# Patient Record
Sex: FEMALE | URBAN - METROPOLITAN AREA
[De-identification: names, ages, dates, MRNs, and addresses within clinical notes are randomized per-mention and may not be internally consistent; named-entity substitution may affect disease eponyms.]

---

## 2024-09-23 ENCOUNTER — APPOINTMENT (OUTPATIENT)
Dept: MEDICAL GROUP | Facility: MEDICAL CENTER | Age: 34
End: 2024-09-23

## 2024-10-29 ENCOUNTER — RESEARCH ENCOUNTER (OUTPATIENT)
Dept: RESEARCH | Facility: MEDICAL CENTER | Age: 34
End: 2024-10-29

## 2024-10-29 ENCOUNTER — HOSPITAL ENCOUNTER (OUTPATIENT)
Dept: LAB | Facility: MEDICAL CENTER | Age: 34
End: 2024-10-29
Attending: STUDENT IN AN ORGANIZED HEALTH CARE EDUCATION/TRAINING PROGRAM
Payer: COMMERCIAL

## 2024-10-29 ENCOUNTER — OFFICE VISIT (OUTPATIENT)
Dept: MEDICAL GROUP | Facility: MEDICAL CENTER | Age: 34
End: 2024-10-29
Payer: COMMERCIAL

## 2024-10-29 VITALS
HEART RATE: 92 BPM | HEIGHT: 62 IN | BODY MASS INDEX: 24.86 KG/M2 | OXYGEN SATURATION: 94 % | WEIGHT: 135.1 LBS | DIASTOLIC BLOOD PRESSURE: 70 MMHG | TEMPERATURE: 97.9 F | SYSTOLIC BLOOD PRESSURE: 100 MMHG

## 2024-10-29 DIAGNOSIS — E10.9 TYPE 1 DIABETES MELLITUS WITHOUT COMPLICATION (HCC): Primary | Chronic | ICD-10-CM

## 2024-10-29 DIAGNOSIS — Z80.3 FAMILY HISTORY OF BREAST CANCER: ICD-10-CM

## 2024-10-29 DIAGNOSIS — E78.5 HYPERLIPIDEMIA, UNSPECIFIED HYPERLIPIDEMIA TYPE: ICD-10-CM

## 2024-10-29 DIAGNOSIS — Z00.00 HEALTHCARE MAINTENANCE: ICD-10-CM

## 2024-10-29 DIAGNOSIS — F43.21 GRIEF REACTION: ICD-10-CM

## 2024-10-29 DIAGNOSIS — Z53.1 TRANSFUSION OF BLOOD PRODUCT REFUSED FOR RELIGIOUS REASON: ICD-10-CM

## 2024-10-29 DIAGNOSIS — Z12.4 SCREENING FOR CERVICAL CANCER: ICD-10-CM

## 2024-10-29 PROBLEM — F41.1 GAD (GENERALIZED ANXIETY DISORDER): Status: ACTIVE | Noted: 2023-07-17

## 2024-10-29 PROBLEM — F43.20 GRIEF REACTION: Status: ACTIVE | Noted: 2023-07-17

## 2024-10-29 PROBLEM — N91.5 OLIGOMENORRHEA: Status: ACTIVE | Noted: 2017-05-23

## 2024-10-29 PROBLEM — G56.22 ULNAR NEUROPATHY AT ELBOW OF LEFT UPPER EXTREMITY: Status: ACTIVE | Noted: 2020-05-06

## 2024-10-29 PROBLEM — N91.5 OLIGOMENORRHEA: Status: RESOLVED | Noted: 2017-05-23 | Resolved: 2024-10-29

## 2024-10-29 PROBLEM — G56.22 ULNAR NEUROPATHY AT ELBOW OF LEFT UPPER EXTREMITY: Status: RESOLVED | Noted: 2020-05-06 | Resolved: 2024-10-29

## 2024-10-29 LAB
ALBUMIN SERPL BCP-MCNC: 4.2 G/DL (ref 3.2–4.9)
ALBUMIN/GLOB SERPL: 1.3 G/DL
ALP SERPL-CCNC: 49 U/L (ref 30–99)
ALT SERPL-CCNC: 15 U/L (ref 2–50)
ANION GAP SERPL CALC-SCNC: 12 MMOL/L (ref 7–16)
AST SERPL-CCNC: 15 U/L (ref 12–45)
BILIRUB SERPL-MCNC: 0.3 MG/DL (ref 0.1–1.5)
BUN SERPL-MCNC: 13 MG/DL (ref 8–22)
CALCIUM ALBUM COR SERPL-MCNC: 9.4 MG/DL (ref 8.5–10.5)
CALCIUM SERPL-MCNC: 9.6 MG/DL (ref 8.5–10.5)
CHLORIDE SERPL-SCNC: 98 MMOL/L (ref 96–112)
CHOLEST SERPL-MCNC: 179 MG/DL (ref 100–199)
CO2 SERPL-SCNC: 24 MMOL/L (ref 20–33)
CREAT SERPL-MCNC: 0.67 MG/DL (ref 0.5–1.4)
ERYTHROCYTE [DISTWIDTH] IN BLOOD BY AUTOMATED COUNT: 40 FL (ref 35.9–50)
FASTING STATUS PATIENT QL REPORTED: NORMAL
GFR SERPLBLD CREATININE-BSD FMLA CKD-EPI: 117 ML/MIN/1.73 M 2
GLOBULIN SER CALC-MCNC: 3.3 G/DL (ref 1.9–3.5)
GLUCOSE SERPL-MCNC: 144 MG/DL (ref 65–99)
HBA1C MFR BLD: 7.3 % (ref ?–5.8)
HCT VFR BLD AUTO: 39 % (ref 37–47)
HDLC SERPL-MCNC: 79 MG/DL
HGB BLD-MCNC: 12.6 G/DL (ref 12–16)
LDLC SERPL CALC-MCNC: 88 MG/DL
MCH RBC QN AUTO: 24.6 PG (ref 27–33)
MCHC RBC AUTO-ENTMCNC: 32.3 G/DL (ref 32.2–35.5)
MCV RBC AUTO: 76 FL (ref 81.4–97.8)
PLATELET # BLD AUTO: 288 K/UL (ref 164–446)
PMV BLD AUTO: 11.3 FL (ref 9–12.9)
POCT INT CON NEG: NEGATIVE
POCT INT CON POS: POSITIVE
POTASSIUM SERPL-SCNC: 3.9 MMOL/L (ref 3.6–5.5)
PROT SERPL-MCNC: 7.5 G/DL (ref 6–8.2)
RBC # BLD AUTO: 5.13 M/UL (ref 4.2–5.4)
SODIUM SERPL-SCNC: 134 MMOL/L (ref 135–145)
TRIGL SERPL-MCNC: 61 MG/DL (ref 0–149)
TSH SERPL DL<=0.005 MIU/L-ACNC: 2.03 UIU/ML (ref 0.38–5.33)
WBC # BLD AUTO: 5.1 K/UL (ref 4.8–10.8)

## 2024-10-29 PROCEDURE — 80061 LIPID PANEL: CPT

## 2024-10-29 PROCEDURE — 80053 COMPREHEN METABOLIC PANEL: CPT

## 2024-10-29 PROCEDURE — 85027 COMPLETE CBC AUTOMATED: CPT

## 2024-10-29 PROCEDURE — 84443 ASSAY THYROID STIM HORMONE: CPT

## 2024-10-29 PROCEDURE — 36415 COLL VENOUS BLD VENIPUNCTURE: CPT

## 2024-10-29 RX ORDER — INSULIN LISPRO 100 [IU]/ML
INJECTION, SOLUTION INTRAVENOUS; SUBCUTANEOUS
COMMUNITY
Start: 2024-10-21

## 2024-10-29 RX ORDER — ACYCLOVIR 400 MG/1
TABLET ORAL
COMMUNITY
Start: 2024-09-12

## 2024-10-29 ASSESSMENT — FIBROSIS 4 INDEX: FIB4 SCORE: 0.51

## 2024-10-29 ASSESSMENT — PATIENT HEALTH QUESTIONNAIRE - PHQ9: CLINICAL INTERPRETATION OF PHQ2 SCORE: 0

## 2024-10-29 NOTE — RESEARCH NOTE
Patient has been referred by Rhonda Garcia M.D.. The initial referral follow-up message, which includes the consent form link, has been sent to the patient.    Eligible Studies: HNP

## 2024-11-04 ENCOUNTER — OFFICE VISIT (OUTPATIENT)
Dept: MEDICAL GROUP | Facility: MEDICAL CENTER | Age: 34
End: 2024-11-04
Payer: COMMERCIAL

## 2024-11-04 ENCOUNTER — HOSPITAL ENCOUNTER (OUTPATIENT)
Facility: MEDICAL CENTER | Age: 34
End: 2024-11-04
Attending: STUDENT IN AN ORGANIZED HEALTH CARE EDUCATION/TRAINING PROGRAM
Payer: COMMERCIAL

## 2024-11-04 VITALS
RESPIRATION RATE: 100 BRPM | SYSTOLIC BLOOD PRESSURE: 110 MMHG | DIASTOLIC BLOOD PRESSURE: 66 MMHG | TEMPERATURE: 97 F | BODY MASS INDEX: 24.29 KG/M2 | HEIGHT: 62 IN | WEIGHT: 132 LBS | HEART RATE: 95 BPM

## 2024-11-04 DIAGNOSIS — E61.1 IRON DEFICIENCY: ICD-10-CM

## 2024-11-04 DIAGNOSIS — Z01.84 IMMUNITY STATUS TESTING: ICD-10-CM

## 2024-11-04 DIAGNOSIS — R71.8 LOW MEAN CORPUSCULAR VOLUME (MCV): ICD-10-CM

## 2024-11-04 DIAGNOSIS — Z01.419 WELL WOMAN EXAM: ICD-10-CM

## 2024-11-04 DIAGNOSIS — Z12.4 SCREENING FOR CERVICAL CANCER: ICD-10-CM

## 2024-11-04 DIAGNOSIS — E10.9 TYPE 1 DIABETES MELLITUS WITHOUT COMPLICATION (HCC): ICD-10-CM

## 2024-11-04 PROCEDURE — 88142 CYTOPATH C/V THIN LAYER: CPT

## 2024-11-04 PROCEDURE — 99395 PREV VISIT EST AGE 18-39: CPT | Performed by: STUDENT IN AN ORGANIZED HEALTH CARE EDUCATION/TRAINING PROGRAM

## 2024-11-04 PROCEDURE — 99213 OFFICE O/P EST LOW 20 MIN: CPT | Mod: 25 | Performed by: STUDENT IN AN ORGANIZED HEALTH CARE EDUCATION/TRAINING PROGRAM

## 2024-11-04 PROCEDURE — 3074F SYST BP LT 130 MM HG: CPT | Performed by: STUDENT IN AN ORGANIZED HEALTH CARE EDUCATION/TRAINING PROGRAM

## 2024-11-04 PROCEDURE — 3078F DIAST BP <80 MM HG: CPT | Performed by: STUDENT IN AN ORGANIZED HEALTH CARE EDUCATION/TRAINING PROGRAM

## 2024-11-04 PROCEDURE — 87624 HPV HI-RISK TYP POOLED RSLT: CPT

## 2024-11-04 RX ORDER — CYCLOBENZAPRINE HCL 5 MG
TABLET ORAL
COMMUNITY
Start: 2024-10-22

## 2024-11-04 ASSESSMENT — FIBROSIS 4 INDEX: FIB4 SCORE: 0.46

## 2024-11-04 NOTE — PROGRESS NOTES
SUBJECTIVE: 34 y.o. female for annual routine gynecologic exam  Chief Complaint   Patient presents with    Annual Exam     Pap      Labs reviewed     Verbal consent was acquired by the patient to use [place] ambient listening note generation during this visit Yes      Her menstrual cycle is characterized by heavy bleeding during the initial days, which then subsides by the third or fourth day. She has a history of ovarian cysts, which cause significant discomfort to the point of immobility. To manage this pain, she takes Advil. She also mentions experiencing shortness of breath when walking uphill.    Laboratory Studies  Hemoglobin is 12.6.   A1c is 7.3.      OB History   No obstetric history on file.      Social History     Substance and Sexual Activity   Sexual Activity Not on file     H/O Abnormal Pap no  She  reports that she has never smoked. She has never used smokeless tobacco.        Allergies: Patient has no allergy information on record.     ROS:      No significant bloating/fluid retention, pelvic pain, or dyspareunia. No vaginal discharge   No breast tenderness, mass, nipple discharge, changes in size or contour, or abnormal cyclic discomfort.  No urinary tract symptoms, no incontinence.   No abdominal pain, change in bowel habits, black or bloody stools.    No unusual headaches, no visual changes, menstrual migraines   No prolonged cough. No dyspnea or chest pain on exertion.  No depression, labile mood, anxiety ,libido changes, insomnia.  No new/concerning skin lesions, concerns.     Exercise: moderate regular exercise program  Preventive Care:  Health Maintenance Topics with due status: Overdue       Topic Date Due    HIV Screening Never done    Diabetes: Urine Protein Screening Never done    Hepatitis B Vaccine (Hep B) Never done    Pneumococcal Vaccine: 0-64 Years 11/06/2021    Influenza Vaccine 09/01/2024    COVID-19 Vaccine 09/01/2024       Current medicines (including changes today)  Current  "Outpatient Medications   Medication Sig Dispense Refill    cyclobenzaprine (FLEXERIL) 5 mg tablet TAKE 1 TABLET BY MOUTH DAILY AS NEEDED FOR MUSCLE SPASMS.      insulin lispro 100 UNIT/ML INJ       MAGNESIUM PO Take  by mouth.      Continuous Glucose Sensor (DEXCOM G7 SENSOR) Misc REPLACE EVERY 10 DAYS AND AS NEEDED FOR SENSOR FAILURE.       No current facility-administered medications for this visit.     She  has a past medical history of Ulnar neuropathy at elbow of left upper extremity (05/06/2020).  She  has no past surgical history on file.     Family History:   Family History   Problem Relation Age of Onset    Hypertension Mother     Diabetes Father     Hypertension Father     Breast Cancer Other        Family History negative for : Breast, Colon, Lung, or female organ cancer, thyroid disease, CAD,  osteoporosis.     OBJECTIVE:   /66 (BP Location: Left arm, Patient Position: Sitting, BP Cuff Size: Adult)   Pulse 95   Temp 36.1 °C (97 °F) (Temporal)   Resp (!) 100   Ht 1.575 m (5' 2\")   Wt 59.9 kg (132 lb)   LMP 10/30/2024   BMI 24.14 kg/m²   Body mass index is 24.14 kg/m².    HEAD AND NECK:  Ears normal.  Throat, oral cavity and tongue normal.  Neck supple. No adenopathy or masses in the neck or supraclavicular regions.  No carotid bruits. No thyromegaly. NEURO: Cranial nerves are normal. DTR's normal and symmetric.  CHEST:  Clear, good air entry, no wheezes or rales. HEART:  Regular rate and rhythm.  S1 and S2 normal.   No edema or JVD. ABDOMEN:  Soft without tenderness, guarding, mass or organomegaly.  No CVA tenderness or inguinal adenopathy. EXTREMITIES:  Extremities, reflexes and peripheral pulses are normal. SKIN: color normal, vascularity normal, no edema, temperature normal   No rashes or suspicious skin lesions noted.     Breast Exam: Performed with instruction during examination. No axillary lymphadenopathy, no skin changes, no dominant masses. No nipple retraction  Pelvic Exam -  Normal " external genitalia with no lesions. Normal vaginal mucosa with normal rugation and no discharge. Cervix with no visible lesions. No cervical motion tenderness. Uterus is normal sized with no masses. No adnexal tenderness or enlargement appreciated. Sure Path Pap is obtained, vaginal swab is obtained and specimen(s) sent to lab  Rectal: deferred    <ASSESSMENT and PLAN>  1. Well woman exam  THINPREP PAP WITH HPV      2. Screening for cervical cancer  THINPREP PAP WITH HPV      3. Type 1 diabetes mellitus without complication (HCC)  Diabetic Monofilament LE Exam    CANCELED: MICROALBUMIN CREAT RATIO URINE    CANCELED: MICROALBUMIN CREAT RATIO URINE      4. Low mean corpuscular volume (MCV)  IRON/TOTAL IRON BIND    FERRITIN      5. Immunity status testing  HEP B SURFACE AB        1. Well woman exam  2. Screening for cervical cancer    - THINPREP PAP WITH HPV; Future    3. Type 1 diabetes mellitus without complication (HCC)  Chronic , stable  Followed by endocrinology ( [place])  [place] around age 10yrs old   A1C : 7.3% , improved   Plan:  Continue current treatment and follow up with endocrinology  On Insulin pump --> iLet insulin pump in 6/2024. ( Supplier [employer])   Gets Dexcom  from [facility]   Eye exam : 09/24  ([facility] )  Foot exam : normal   - Diabetic Monofilament LE Exam  Monofilament testing with a 10 gram force: sensation intact: intact bilaterally  Visual Inspection: Feet without maceration, ulcers, fissures.  Pedal pulses: intact bilaterally      4. Low mean corpuscular volume (MCV)  Iron deficiency    - ferrous sulfate 325 (65 Fe) MG tablet; Take 1 Tablet by mouth every 48 hours.  Dispense: 30 Tablet; Refill: 0    Her hemoglobin level is 12.6, indicating no anemia, but the small size of her RBCs suggests a potential slight iron deficiency. She was advised to incorporate iron-rich foods into her diet, such as sesame seeds, avocados, dates, spinach, green leafy  vegetables, nuts, flaxseeds, and pine nuts. Iron levels will be checked. If found to be low, iron supplements will be prescribed every other day. Increased fiber and fluid intake was also suggested to prevent constipation from iron supplements.  - IRON/TOTAL IRON BIND; Future  - FERRITIN; Future    5. Immunity status testing  - HEP B SURFACE AB; Future      Discussed  breast self exam, mammography screening, STD prevention, feminine hygiene, use and side effects of OCP's, adequate intake of calcium and vitamin D, diet and exercise   Follow-up in 1 years for next Gyn exam and 3 years for next Pap.   Next office visit for recheck of chronic medical conditions is due in 6 months

## 2024-11-05 DIAGNOSIS — Z01.419 WELL WOMAN EXAM: ICD-10-CM

## 2024-11-05 DIAGNOSIS — Z12.4 SCREENING FOR CERVICAL CANCER: ICD-10-CM

## 2024-11-08 ENCOUNTER — HOSPITAL ENCOUNTER (OUTPATIENT)
Dept: LAB | Facility: MEDICAL CENTER | Age: 34
End: 2024-11-08
Attending: STUDENT IN AN ORGANIZED HEALTH CARE EDUCATION/TRAINING PROGRAM
Payer: COMMERCIAL

## 2024-11-08 DIAGNOSIS — R71.8 LOW MEAN CORPUSCULAR VOLUME (MCV): ICD-10-CM

## 2024-11-08 DIAGNOSIS — Z01.84 IMMUNITY STATUS TESTING: ICD-10-CM

## 2024-11-08 LAB
FERRITIN SERPL-MCNC: 73.1 NG/ML (ref 10–291)
HBV SURFACE AB SERPL IA-ACNC: 5571 MIU/ML (ref 0–10)
IRON SATN MFR SERPL: 11 % (ref 15–55)
IRON SERPL-MCNC: 39 UG/DL (ref 40–170)
TIBC SERPL-MCNC: 359 UG/DL (ref 250–450)
UIBC SERPL-MCNC: 320 UG/DL (ref 110–370)

## 2024-11-08 PROCEDURE — 86706 HEP B SURFACE ANTIBODY: CPT

## 2024-11-08 PROCEDURE — 36415 COLL VENOUS BLD VENIPUNCTURE: CPT

## 2024-11-08 PROCEDURE — 83550 IRON BINDING TEST: CPT

## 2024-11-08 PROCEDURE — 83540 ASSAY OF IRON: CPT

## 2024-11-08 PROCEDURE — 82728 ASSAY OF FERRITIN: CPT

## 2024-11-10 ENCOUNTER — PATIENT MESSAGE (OUTPATIENT)
Dept: MEDICAL GROUP | Facility: MEDICAL CENTER | Age: 34
End: 2024-11-10
Payer: COMMERCIAL

## 2024-11-10 RX ORDER — FERROUS SULFATE 325(65) MG
325 TABLET ORAL
Qty: 30 TABLET | Refills: 0 | Status: SHIPPED | OUTPATIENT
Start: 2024-11-10

## 2024-11-19 LAB
HPV I/H RISK 1 DNA SPEC QL NAA+PROBE: NOT DETECTED
SPECIMEN SOURCE: NORMAL
THINPREP PAP, CYTOLOGY NL11781: NORMAL

## 2024-12-06 ENCOUNTER — APPOINTMENT (OUTPATIENT)
Dept: MEDICAL GROUP | Facility: MEDICAL CENTER | Age: 34
End: 2024-12-06

## 2025-01-02 DIAGNOSIS — E61.1 IRON DEFICIENCY: ICD-10-CM

## 2025-01-02 RX ORDER — FERROUS SULFATE 325(65) MG
325 TABLET ORAL
Qty: 45 TABLET | Refills: 1 | Status: SHIPPED | OUTPATIENT
Start: 2025-01-02

## 2025-01-02 NOTE — TELEPHONE ENCOUNTER
Received request via: Patient    Was the patient seen in the last year in this department? Yes    Does the patient have an active prescription (recently filled or refills available) for medication(s) requested? No      Does the patient have MCFP Plus and need 100-day supply? (This applies to ALL medications) Patient does not have SCP

## 2025-01-31 ENCOUNTER — OFFICE VISIT (OUTPATIENT)
Dept: URGENT CARE | Facility: PHYSICIAN GROUP | Age: 35
End: 2025-01-31
Payer: COMMERCIAL

## 2025-01-31 ENCOUNTER — APPOINTMENT (OUTPATIENT)
Dept: RADIOLOGY | Facility: IMAGING CENTER | Age: 35
End: 2025-01-31
Attending: NURSE PRACTITIONER
Payer: COMMERCIAL

## 2025-01-31 VITALS
RESPIRATION RATE: 18 BRPM | DIASTOLIC BLOOD PRESSURE: 64 MMHG | SYSTOLIC BLOOD PRESSURE: 106 MMHG | TEMPERATURE: 97.7 F | WEIGHT: 134 LBS | BODY MASS INDEX: 24.66 KG/M2 | HEART RATE: 84 BPM | HEIGHT: 62 IN | OXYGEN SATURATION: 94 %

## 2025-01-31 DIAGNOSIS — M25.571 ACUTE RIGHT ANKLE PAIN: ICD-10-CM

## 2025-01-31 DIAGNOSIS — S93.401A SPRAIN OF RIGHT ANKLE, UNSPECIFIED LIGAMENT, INITIAL ENCOUNTER: ICD-10-CM

## 2025-01-31 PROCEDURE — 73610 X-RAY EXAM OF ANKLE: CPT | Mod: TC,RT | Performed by: RADIOLOGY

## 2025-01-31 RX ORDER — INSULIN GLARGINE 100 [IU]/ML
INJECTION, SOLUTION SUBCUTANEOUS
COMMUNITY

## 2025-01-31 RX ORDER — IBUPROFEN 600 MG/1
TABLET ORAL
COMMUNITY
Start: 2024-08-21

## 2025-01-31 ASSESSMENT — VISUAL ACUITY: OU: 1

## 2025-01-31 ASSESSMENT — FIBROSIS 4 INDEX: FIB4 SCORE: 0.46

## 2025-01-31 NOTE — PROGRESS NOTES
Subjective:     Cheryl Pimentel is a 34 y.o. female who presents for Ankle Pain (Sprained ankle x3 weeks/Burning while walking )       Ankle Pain   [place]     Ambient listening software ([employer]) used during this encounter with the consent of the patient. The following text is AI-assisted:    History of Present Illness  The patient presents for evaluation of a right ankle sprain.    Right Ankle Sprain  She sustained the injury on 12/22/2024 during snowboarding due to a twisting motion [first lesson]. Initially advised to allow a healing period of 3 weeks. Swelling has subsided, and she can ambulate. This week, she began experiencing a burning sensation in her ankle during ambulation, absent upon palpation. No associated numbness or tingling. Onset of burning sensation was Monday, following initial improvement in swelling and pain. A persistent lump is noted. Managing symptoms with ice and over-the-counter analgesics (aspirin, Advil).  - Onset: Injury on 12/22/2024; burning sensation began on Monday.  - Location: Right ankle.  - Duration: Burning sensation started this week; initial injury healing period of 3 weeks.  - Character: Burning sensation during ambulation, absent upon palpation; persistent lump.  - Alleviating/Aggravating Factors: Managing symptoms with ice and over-the-counter analgesics (aspirin, Advil).  - Timing: Burning sensation during ambulation.  - Severity: Swelling has subsided; no associated numbness or tingling.    MEDICATIONS  - Aspirin  - Advil    Was evaluated at on site clinic and reports x-ray was negative.    Review of Systems   Musculoskeletal:         R ankle pain   All other systems reviewed and are negative.  Refer to HPI for additional details.    During this visit, appropriate PPE was worn, and hand hygiene was performed.    PMH:  has a past medical history of Ulnar neuropathy at elbow of left upper extremity (05/06/2020).    MEDS:   Current  "Outpatient Medications:     Glucagon, rDNA, (GLUCAGON EMERGENCY) 1 MG Kit, USE AS DIRECTED FOR LOW BLOOD SUGAR., Disp: , Rfl:     insulin glargine (LANTUS) 100 UNIT/ML SC SOLN, , Disp: , Rfl:     ferrous sulfate 325 (65 Fe) MG tablet, TAKE 1 TABLET BY MOUTH EVERY 48 HOURS., Disp: 45 Tablet, Rfl: 1    cyclobenzaprine (FLEXERIL) 5 mg tablet, TAKE 1 TABLET BY MOUTH DAILY AS NEEDED FOR MUSCLE SPASMS., Disp: , Rfl:     insulin lispro 100 UNIT/ML INJ, , Disp: , Rfl:     MAGNESIUM PO, Take  by mouth., Disp: , Rfl:     Continuous Glucose Sensor (DEXCOM G7 SENSOR) Misc, REPLACE EVERY 10 DAYS AND AS NEEDED FOR SENSOR FAILURE., Disp: , Rfl:     ALLERGIES: No Known Allergies  SURGHX: History reviewed. No pertinent surgical history.  SOCHX:  reports that she has never smoked. She has never used smokeless tobacco.    FH: Per HPI as applicable/pertinent.      Objective:     /64 (BP Location: Right arm, Patient Position: Sitting, BP Cuff Size: Adult)   Pulse 84   Temp 36.5 °C (97.7 °F) (Temporal)   Resp 18   Ht 1.575 m (5' 2\")   Wt 60.8 kg (134 lb)   SpO2 94%   BMI 24.51 kg/m²     Physical Exam  Nursing note reviewed.   Constitutional:       General: She is not in acute distress.     Appearance: She is well-developed. She is not ill-appearing or toxic-appearing.   Eyes:      General: Vision grossly intact.   Cardiovascular:      Rate and Rhythm: Normal rate.      Pulses: Normal pulses.   Pulmonary:      Effort: Pulmonary effort is normal. No respiratory distress.   Musculoskeletal:         General: No deformity. Normal range of motion.      Right ankle: No swelling, deformity or lacerations. Tenderness present over the lateral malleolus. Normal range of motion. Anterior drawer test negative.      Right Achilles Tendon: No tenderness or defects. Villarreal's test negative.      Right foot: Normal pulse.   Skin:     General: Skin is warm and dry.      Coloration: Skin is not pale.      Findings: No bruising, erythema or " rash.   Neurological:      Mental Status: She is alert and oriented to person, place, and time.      Motor: No weakness.      Gait: Gait normal.   Psychiatric:         Behavior: Behavior normal. Behavior is cooperative.     XR R Ankle:    Details    Reading Physician Reading Date Result Priority   Андрей Fitzgerald M.D.  [phone] 1/31/2025      Narrative & Impression     1/31/2025 3:36 PM     HISTORY/REASON FOR EXAM:  Pain/Deformity Following Trauma    TECHNIQUE/EXAM DESCRIPTION AND NUMBER OF VIEWS:  3 views of the RIGHT ankle.     COMPARISON: None.     FINDINGS:  No acute fracture or dislocation. The ankle mortise is intact.     No joint arthropathy.     IMPRESSION:     No acute osseous abnormality.        Exam Ended: 01/31/25  3:48 PM Last Resulted: 01/31/25  4:09 PM     Radiology report and images reviewed by myself. Concur with findings.      Assessment/Plan:     1. Acute right ankle pain  - DX-ANKLE 3+ VIEWS RIGHT; Future  - MR-ANKLE W/O RIGHT; Future  - Referral to Sports Medicine    2. Sprain of right ankle, unspecified ligament, initial encounter  - Referral to Sports Medicine    Rest, ice, compression, and elevation (RICE) and over-the-counter acetaminophen alternating with ibuprofen, per 's instructions, as needed for pain. Continue elastic bandage with WBAT.    MRI pending.    Follow up with sports medicine; referral placed. If MRI concerning, then may refer to ortho/podiatry.    Monitor. Warning signs reviewed. Return precautions advised.     Differential diagnosis, natural history, supportive care, over-the-counter symptom management per 's instructions, close monitoring, and indications for immediate follow-up discussed.     All questions answered. Patient agrees with the plan of care.    Discharge summary provided.

## 2025-02-03 ENCOUNTER — HOSPITAL ENCOUNTER (OUTPATIENT)
Dept: RADIOLOGY | Facility: MEDICAL CENTER | Age: 35
End: 2025-02-03
Attending: NURSE PRACTITIONER
Payer: COMMERCIAL

## 2025-02-03 DIAGNOSIS — M25.571 ACUTE RIGHT ANKLE PAIN: ICD-10-CM

## 2025-02-03 PROCEDURE — 73721 MRI JNT OF LWR EXTRE W/O DYE: CPT | Mod: RT

## 2025-02-04 ENCOUNTER — PATIENT MESSAGE (OUTPATIENT)
Dept: URGENT CARE | Facility: CLINIC | Age: 35
End: 2025-02-04

## 2025-02-04 ENCOUNTER — APPOINTMENT (OUTPATIENT)
Dept: OBGYN | Facility: CLINIC | Age: 35
End: 2025-02-04
Attending: STUDENT IN AN ORGANIZED HEALTH CARE EDUCATION/TRAINING PROGRAM
Payer: COMMERCIAL

## 2025-02-04 ENCOUNTER — HOSPITAL ENCOUNTER (OUTPATIENT)
Facility: MEDICAL CENTER | Age: 35
End: 2025-02-04
Attending: PHYSICIAN ASSISTANT
Payer: COMMERCIAL

## 2025-02-04 VITALS — SYSTOLIC BLOOD PRESSURE: 110 MMHG | WEIGHT: 136.4 LBS | BODY MASS INDEX: 24.95 KG/M2 | DIASTOLIC BLOOD PRESSURE: 84 MMHG

## 2025-02-04 DIAGNOSIS — Z87.42 HX OF OVARIAN CYST: ICD-10-CM

## 2025-02-04 DIAGNOSIS — Z01.419 WELL WOMAN EXAM: ICD-10-CM

## 2025-02-04 DIAGNOSIS — Z12.4 SCREENING FOR CERVICAL CANCER: ICD-10-CM

## 2025-02-04 DIAGNOSIS — S93.401A SPRAIN OF RIGHT ANKLE, UNSPECIFIED LIGAMENT, INITIAL ENCOUNTER: ICD-10-CM

## 2025-02-04 DIAGNOSIS — R10.2 PELVIC PAIN: ICD-10-CM

## 2025-02-04 PROCEDURE — 3079F DIAST BP 80-89 MM HG: CPT | Performed by: PHYSICIAN ASSISTANT

## 2025-02-04 PROCEDURE — 88142 CYTOPATH C/V THIN LAYER: CPT

## 2025-02-04 PROCEDURE — 99385 PREV VISIT NEW AGE 18-39: CPT | Performed by: PHYSICIAN ASSISTANT

## 2025-02-04 PROCEDURE — 3074F SYST BP LT 130 MM HG: CPT | Performed by: PHYSICIAN ASSISTANT

## 2025-02-04 PROCEDURE — 87624 HPV HI-RISK TYP POOLED RSLT: CPT

## 2025-02-04 ASSESSMENT — FIBROSIS 4 INDEX: FIB4 SCORE: 0.46

## 2025-02-04 NOTE — PROGRESS NOTES
MR-ANKLE W/O RIGHT  Order: [id number]   Visible to patient: Yes (not seen)       Dx: Acute right ankle pain    0 Result Notes       1 Follow-up Encounter  Details    Reading Physician Reading Date Result Priority   Jcarlos Mercado M.D.  [phone] 2/4/2025      Narrative & Impression     2/3/2025 4:50 PM     HISTORY/REASON FOR EXAM:  R ankle sprain.        TECHNIQUE/EXAM DESCRIPTION:  MRI of the RIGHT ankle without contrast.     The study was performed on a [employer] 1.5 Ch[place]ita MRI scanner. T1 axial and sagittal, fast spin-echo T2 fat-suppressed axial and coronal, and fast inversion recovery sagittal images were obtained.     COMPARISON:  Radiographs dated 1/31/2025     FINDINGS:  There is no fracture, dislocation, or evidence for osteonecrosis. 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     The flexor, extensor, and peroneal tendons are intact. The Achilles tendon and the visualized portion of the plantar aponeurosis are also intact and normal in appearance.     There is thickening and intermediate signal of the anterior tibiofibular ligament consistent with a moderate sprain. There is bone marrow edema within the anterior fibula in this region. There is also bone marrow edema within the posterolateral tibia at   the insertion of the posterior tibiofibular ligament which may be due to some partial tearing at the attachment. There is a moderate sprain/partial tear of the anterior talofibular ligament. The posterior talofibular ligament and calcaneofibular ligament   are intact. The deltoid ligament is also intact. Components of the spring ligament appear intact as well.     The sinus tarsi fat is preserved. No abnormality is appreciated in the tarsal tunnel.     There is a small tibiotalar joint effusion.     IMPRESSION:    1. There is no fracture, dislocation, or evidence for osteonecrosis.  2. No tendon tear is evident.  3. Moderate sprain/partial tears of the anterior tibiofibular  and anterior talofibular ligaments with possible partial tearing at the tibial attachment of the posterior tibiofibular ligament.        Exam Ended: 02/03/25  5:52 PM Last Resulted: 02/04/25  8:09 AM

## 2025-02-04 NOTE — PROGRESS NOTES
Patient here for annual exam.   Last pap done/results :   LMP : 01/29/2025  BCM : none   Last Mammogram, if applicable:   Pt states no concerns   Phone/Pharmacy verified

## 2025-02-04 NOTE — PROGRESS NOTES
ANNUAL GYNECOLOGY VISIT    Chief Complaint  Annual    Subjective  April Melisa Pimentel is a 34 y.o. female  Patient's last menstrual period was 2025. using nothing for contraception who presents today for Annual Exam.      Reports intermittent 'flare ups' of debilitating pelvic pain typically before cycle starts. Pain also to rectal area. Symptoms manageable with Advil. Flares occur a few times a year. Dyspareunia with infrequent intercourse.  has spinal cord injury. HX of 3.7cm complex right ovarian cyst in . Never had follow up imaging.    Preventive Care   Immunization History   Administered Date(s) Administered    Influenza Vaccine Quad Inj (Pf) 2022    PFIZER BIVALENT SARS-COV-2 VACCINE (12+) 2022    PFIZER HAYES CAP SARS-COV-2 VACCINATION (12+) 2022    PFIZER PURPLE CAP SARS-COV-2 VACCINATION (12+) 2021, 2021    Pneumococcal polysaccharide vaccine (PPSV-23) 2020    Tdap Vaccine 10/18/2016    Varicella Vaccine Live 2019, 2019       Guardasil HPV vaccine: due    Gynecology History and ROS  Current Sexual Activity: yes - monogamous male partner   History of sexually transmitted diseases? no  Abnormal discharge? no  Current Contraception:  none    Menstrual History  Patient's last menstrual period was 2025.  Periods are regular  q 28 days, lasting 6-7 days.   Clots or heavy flow: no  Dysmenorrhea: yes - first few days   Intermenstrual bleeding/spotting: no  Significant pain with periods:no  Bothersome PMS symptoms: no  Significant Pelvic Pain: no    Pap History  Last pap smear: 2024 neg cotest   History of moderate or severe dysplasia: yes - several years ago, unknown what was abnormal. Cannot recall if there have been normals since prior to Pap in .    Cancer Risk Assessement:  Family history of:   - Breast cancer: Paternal Aunt   - Ovarian cancer: no   - Uterine cancer: no   - Colon cancer: no    Obstetric History  OB History     Para Term  AB Living   0 0 0 0 0 0   SAB IAB Ectopic Molar Multiple Live Births   0 0 0 0 0 0       Past Medical History  Past Medical History:   Diagnosis Date    Ulnar neuropathy at elbow of left upper extremity 2020    10/2019 EMG confirmed         Past Surgical History  History reviewed. No pertinent surgical history.    Social History  Social History     Tobacco Use    Smoking status: Never    Smokeless tobacco: Never   Substance Use Topics    Alcohol use: Not Currently    Drug use: Never        Family History  Family History   Problem Relation Age of Onset    Hypertension Mother     Diabetes Father     Hypertension Father     Breast Cancer Paternal Aunt     Breast Cancer Other        Home Medications  Current Outpatient Medications   Medication Sig    Glucagon, rDNA, (GLUCAGON EMERGENCY) 1 MG Kit USE AS DIRECTED FOR LOW BLOOD SUGAR.    insulin glargine (LANTUS) 100 UNIT/ML SC SOLN     ferrous sulfate 325 (65 Fe) MG tablet TAKE 1 TABLET BY MOUTH EVERY 48 HOURS.    cyclobenzaprine (FLEXERIL) 5 mg tablet TAKE 1 TABLET BY MOUTH DAILY AS NEEDED FOR MUSCLE SPASMS.    insulin lispro 100 UNIT/ML INJ     MAGNESIUM PO Take  by mouth.    Continuous Glucose Sensor (DEXCOM G7 SENSOR) Misc REPLACE EVERY 10 DAYS AND AS NEEDED FOR SENSOR FAILURE.       Allergies/Reactions  No Known Allergies    ROS  Positive ROS: see HPI  Gen: no fevers or chills, no significant weight loss or gain, excessive fatigue  Respiratory:  no cough or dyspnea  Cardiac:  no chest pain, no palpitations, no syncope  Breast: no breast discharge, pain, lump or skin changes  GI:  no heartburn, no abdominal pain, no nausea or vomiting  Urinary: no dysuria, urgency, frequency, incontinence   Psych: no depression or anxiety  Neuro: no migraines with aura, fainting spells, numbness or tingling  Extremities: no joint pain, persistently swollen ankles, recurrent leg cramps      Physical Examination:  Vital Signs: /84 (BP Location:  Left arm, Patient Position: Sitting, BP Cuff Size: Adult)   Wt 136 lb 6.4 oz   LMP 01/29/2025   BMI 24.95 kg/m²       Constitutional: The patient is well developed and well nourished.  Psychiatric: Patient is oriented to time place and person.   Skin: No rash observed.  Neck: Appears symmetric. Thyroid normal size  Respiratory: normal effort  Breast: Inspection reveals no asymetry or nipple discharge, no skin thickening, dimpling or erythema.  Palpation demonstrates no masses.  Abdomen: Soft, non-tender.  Pelvic Exam:      Vulva: external female genitalia are normal in appearance. No lesions     Urethra - no lesions, no erythema     Vagina: moist, pink, normal ruggae     Cervix: pink, smooth, no lesions, no CMT     Uterus - non-tender, normal size, shape, contour, mobile, anteverted     Ovaries: non-tender, no appreciable masses    Pap Smear performed: Yes    Chaperone Present: HUAN Wolfe  Extremeties: Legs are symmetric and without tenderness. There is no edema present.        Assessment & Plan  April Melisa Pimentel is a 34 y.o. female who presents today for Annual Gyn Exam.       # Well woman exam    - Anticipatory guidance given. Encouraged adequate water intake, healthy diet, regular exercise. Educated on Pap smear screening and guidelines for age per ACOG and ASSCP. Discussed safe sex, STI prevention, contraception/family planning. Self breast exam taught.     # Screening for cervical cancer    - THINPREP PAP WITH HPV; Future    # Pelvic pain    - Reviewed options to aid with pelvic pain of OCPs or IUD. However, pt considering trying to conceive. Ordered Pelvic US to r/o underlying pathology.   -Briefly review techniques to aid conception - health diet, regular exercise avoid caffeine and alcohol. Discussed methods and frequency for trying and ovulation testing.   - Offered referral to [facility]. Pt would like to see US results first and will discuss after as sx can be debilitating.   - US-PELVIC  COMPLETE (TRANSABDOMINAL/TRANSVAGINAL) (COMBO);      # Hx of ovarian cyst    - US-PELVIC COMPLETE (TRANSABDOMINAL/TRANSVAGINAL) (COMBO);               Return: pending US results and pt preference     Brii Vincent P.A.-C.  [facility]

## 2025-02-05 DIAGNOSIS — Z12.4 SCREENING FOR CERVICAL CANCER: ICD-10-CM

## 2025-02-06 ENCOUNTER — OFFICE VISIT (OUTPATIENT)
Dept: SPORTS MEDICINE | Facility: OTHER | Age: 35
End: 2025-02-06
Attending: NURSE PRACTITIONER
Payer: COMMERCIAL

## 2025-02-06 VITALS
HEIGHT: 62 IN | SYSTOLIC BLOOD PRESSURE: 118 MMHG | TEMPERATURE: 98.1 F | WEIGHT: 136.4 LBS | DIASTOLIC BLOOD PRESSURE: 80 MMHG | OXYGEN SATURATION: 97 % | BODY MASS INDEX: 25.1 KG/M2 | HEART RATE: 80 BPM | RESPIRATION RATE: 16 BRPM

## 2025-02-06 DIAGNOSIS — S99.911A RIGHT ANKLE INJURY, INITIAL ENCOUNTER: ICD-10-CM

## 2025-02-06 DIAGNOSIS — T14.8XXA CONTUSION OF BONE: ICD-10-CM

## 2025-02-06 PROCEDURE — 99214 OFFICE O/P EST MOD 30 MIN: CPT | Performed by: FAMILY MEDICINE

## 2025-02-06 PROCEDURE — 3074F SYST BP LT 130 MM HG: CPT | Performed by: FAMILY MEDICINE

## 2025-02-06 PROCEDURE — 3079F DIAST BP 80-89 MM HG: CPT | Performed by: FAMILY MEDICINE

## 2025-02-06 ASSESSMENT — ENCOUNTER SYMPTOMS
SHORTNESS OF BREATH: 0
CHILLS: 0
DIZZINESS: 0
FEVER: 0
NAUSEA: 0
VOMITING: 0

## 2025-02-06 ASSESSMENT — FIBROSIS 4 INDEX: FIB4 SCORE: 0.46

## 2025-02-06 NOTE — LETTER
February 6, 2025         Patient: Cheryl Pimentel   YOB: 1990   Date of Visit: 2/6/2025           To Whom it May Concern:    Cheryl Pimentel was seen in my clinic on 2/6/2025. She may return to work, but she should be allowed to sit 15 minutes/h during her shift and ad jessica. sitting/standing as much as possible until reevaluation in 2 weeks.    If you have any questions or concerns, please don't hesitate to call.        Sincerely,           Coleman Howe M.D.  Electronically Signed

## 2025-02-06 NOTE — PROGRESS NOTES
Chief Complaint   Patient presents with    Ankle Pain     R ankle pain      Subjective     Referred by YOON Collado  for evaluation of RIGHT ankle pain  Date of injury, delayed December 2024  Mechanism, taking a snowboard lesson and was trying to avoid crashing into people so she tried to stop  Overrotated during her stop  No pop or snap at the time of injury  She was getting up from a seated position and rotating her board when it happened  Sudden sharp pain normally in the region of the RIGHT ankle at the lateral ankle  She had to take a ride with  to the clinic onsite  Today, pain is predominantly along the lateral ankle  Pain is 5 out of 10   Worse with ambulation/weightbearing  Seen in urgent care  Had x-rays and referred for further evaluation management  She did undergo MRI as well  She has tried Advil which is helping  Compression wrap provided at urgent care    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 [employer], standing for her entire shift  Activities include walking, hiking and cycling    Review of Systems   Constitutional:  Negative for chills and fever.   Respiratory:  Negative for shortness of breath.    Cardiovascular:  Negative for chest pain.   Gastrointestinal:  Negative for nausea and vomiting.   Neurological:  Negative for dizziness.     PMH:  has a past medical history of H/O insulin dependent diabetes mellitus and Ulnar neuropathy at elbow of left upper extremity (05/06/2020).  MEDS:   Current Outpatient Medications:     Glucagon, rDNA, (GLUCAGON EMERGENCY) 1 MG Kit, USE AS DIRECTED FOR LOW BLOOD SUGAR., Disp: , Rfl:     insulin glargine (LANTUS) 100 UNIT/ML SC SOLN, , Disp: , Rfl:     ferrous sulfate 325 (65 Fe) MG tablet, TAKE 1 TABLET BY MOUTH EVERY 48 HOURS., Disp: 45 Tablet, Rfl: 1    cyclobenzaprine (FLEXERIL) 5 mg tablet, TAKE 1 TABLET BY MOUTH DAILY AS NEEDED FOR MUSCLE SPASMS., Disp: , Rfl:     insulin lispro 100 UNIT/ML INJ, , Disp: , Rfl:     MAGNESIUM PO, Take  by mouth.,  "Disp: , Rfl:     Continuous Glucose Sensor (DEXCOM G7 SENSOR) Misc, REPLACE EVERY 10 DAYS AND AS NEEDED FOR SENSOR FAILURE., Disp: , Rfl:   ALLERGIES: No Known Allergies  SURGHX: History reviewed. No pertinent surgical history.  SOCHX:  reports that she has never smoked. She has never used smokeless tobacco. She reports that she does not currently use alcohol. She reports that she does not use drugs.  FH: Family history was reviewed, no pertinent findings to report    Objective   /80 (BP Location: Left arm, Patient Position: Sitting, BP Cuff Size: Adult)   Pulse 80   Temp 36.7 °C (98.1 °F) (Temporal)   Resp 16   Ht 1.575 m (5' 2\")   Wt 61.9 kg (136 lb 6.4 oz)   LMP 01/29/2025   SpO2 97%   BMI 24.95 kg/m²     RIGHT ANKLE:  There is NO swelling noted at the ankle  Range of motion intact with dorsiflexion and plantarflexion, inversion and eversion  There is NO tenderness of the ATFL, CF or PTF ligament  There is NO tenderness of the lateral malleolus or medial malleolus  She did have some tenderness along posterior malleolus and the trajectory of the peroneal tendons  Anterior drawer testing is NEGATIVE  Talar tilt testing is NEGATIVE  The foot and ankle is otherwise neurovascularly intact    RIGHT FOOT:  There is NO swelling noted at the foot  There is NO tenderness at the base of the fifth metatarsal, cuboid, or tarsal navicular  There is NO pain with metatarsal squeeze test    LEFT ANKLE:  There is NO swelling noted at the ankle  Range of motion intact with dorsiflexion and plantarflexion, inversion and eversion  There is NO tenderness of the ATFL, CF or PTF ligament  There is NO tenderness of the lateral malleolus or medial malleolus  Anterior drawer testing is NEGATIVE  Talar tilt testing is NEGATIVE  The foot and ankle is otherwise neurovascularly intact    LEFT FOOT:  There is NO swelling noted at the foot  There is NO tenderness at the base of the fifth metatarsal, cuboid, or tarsal " navicular  There is NO pain with metatarsal squeeze test    NEUTRAL stance  Able to ambulate with NORMAL gait    1. Right ankle injury, initial encounter        2. Contusion of bone          Date of injury, delayed December 2024  Mechanism, taking a snowboard lesson and was trying to avoid crashing into people so she tried to stop    Fitted with functional ankle brace   Provided with ankle exercise program    Provided with work note to allow it to be seated for 15 minutes/h or ad jessica. is much as she can during her shift until reevaluation in 2 weeks    Return in about 2 weeks (around 2/20/2025).  To see how she is doing with ankle brace, work limitations and home exercise program at that point  If she continues to struggle, she may need to do a period of full nonweightbearing              2/3/2025 4:50 PM     HISTORY/REASON FOR EXAM:  R ankle sprain.        TECHNIQUE/EXAM DESCRIPTION:  MRI of the RIGHT ankle without contrast.     The study was performed on a [facility] 1.5 [employer] MRI scanner. T1 axial and sagittal, fast spin-echo T2 fat-suppressed axial and coronal, and fast inversion recovery sagittal images were obtained.     COMPARISON:  Radiographs dated 1/31/2025     FINDINGS:  There is no fracture, dislocation, or evidence for osteonecrosis. 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     The flexor, extensor, and peroneal tendons are intact. The Achilles tendon and the visualized portion of the plantar aponeurosis are also intact and normal in appearance.     There is thickening and intermediate signal of the anterior tibiofibular ligament consistent with a moderate sprain. There is bone marrow edema within the anterior fibula in this region. There is also bone marrow edema within the posterolateral tibia at   the insertion of the posterior tibiofibular ligament which may be due to some partial tearing at the attachment. There is a moderate sprain/partial tear of the anterior  talofibular ligament. The posterior talofibular ligament and calcaneofibular ligament   are intact. The deltoid ligament is also intact. Components of the spring ligament appear intact as well.     The sinus tarsi fat is preserved. No abnormality is appreciated in the tarsal tunnel.     There is a small tibiotalar joint effusion.     IMPRESSION:        1. There is no fracture, dislocation, or evidence for osteonecrosis.  2. No tendon tear is evident.  3. Moderate sprain/partial tears of the anterior tibiofibular and anterior talofibular ligaments with possible partial tearing at the tibial attachment of the posterior tibiofibular ligament.        Exam Ended: 02/03/25  5:52 PM Last Resulted: 02/04/25  8:09 AM           1/31/2025 3:36 PM     HISTORY/REASON FOR EXAM:  Pain/Deformity Following Trauma     TECHNIQUE/EXAM DESCRIPTION AND NUMBER OF VIEWS:  3 views of the RIGHT ankle.     COMPARISON: None.     FINDINGS:  No acute fracture or dislocation. The ankle mortise is intact.     No joint arthropathy.     IMPRESSION:     No acute osseous abnormality.      Interpreted in the office today with the patient    Thank you YOON Collado for allowing me to participate in caring for your patient.

## 2025-02-25 ENCOUNTER — OFFICE VISIT (OUTPATIENT)
Dept: SPORTS MEDICINE | Facility: OTHER | Age: 35
End: 2025-02-25
Payer: COMMERCIAL

## 2025-02-25 VITALS
BODY MASS INDEX: 24.66 KG/M2 | RESPIRATION RATE: 16 BRPM | DIASTOLIC BLOOD PRESSURE: 70 MMHG | OXYGEN SATURATION: 98 % | HEART RATE: 74 BPM | HEIGHT: 62 IN | TEMPERATURE: 97.8 F | SYSTOLIC BLOOD PRESSURE: 116 MMHG | WEIGHT: 134 LBS

## 2025-02-25 DIAGNOSIS — S99.911A RIGHT ANKLE INJURY, INITIAL ENCOUNTER: ICD-10-CM

## 2025-02-25 DIAGNOSIS — T14.8XXA CONTUSION OF BONE: ICD-10-CM

## 2025-02-25 PROCEDURE — 3078F DIAST BP <80 MM HG: CPT | Performed by: FAMILY MEDICINE

## 2025-02-25 PROCEDURE — 3074F SYST BP LT 130 MM HG: CPT | Performed by: FAMILY MEDICINE

## 2025-02-25 PROCEDURE — 99213 OFFICE O/P EST LOW 20 MIN: CPT | Performed by: FAMILY MEDICINE

## 2025-02-25 ASSESSMENT — FIBROSIS 4 INDEX: FIB4 SCORE: 0.55

## 2025-02-25 NOTE — PROGRESS NOTES
"Chief Complaint   Patient presents with    Ankle Injury     R ankle injury      Subjective     Referred by YOON Collado  for evaluation of RIGHT ankle pain  Date of injury, delayed December 2024  Mechanism, taking a snowboard lesson and was trying to avoid crashing into people so she tried to stop  Overrotated during her stop  No pop or snap at the time of injury  She was getting up from a seated position and rotating her board when it happened  Sudden sharp pain normally in the region of the RIGHT ankle at the lateral ankle  She had to take a ride with  to the clinic onsite  Today, pain is predominantly along the lateral ankle  Pain is 5 out of 10   Worse with ambulation/weightbearing  Seen in urgent care  Had x-rays and referred for further evaluation management  She did undergo MRI as well  She has tried Advil which is helping  Compression wrap provided at urgent care    UPDATE:  Fluctuates depending on activity  Some days no pain   And there are some days where she has more pain if she has been on her feet for extended periods of time  She has been using her ankle brace for work  At the end of her shift she does notice that her pain goes away after about 15 to 30 minutes of rest    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 [employer], standing for her entire shift  Activities include walking, hiking and cycling    Objective   /70 (BP Location: Left arm, Patient Position: Sitting, BP Cuff Size: Adult)   Pulse 74   Temp 36.6 °C (97.8 °F) (Temporal)   Resp 16   Ht 1.575 m (5' 2\")   Wt 60.8 kg (134 lb)   LMP 01/29/2025   SpO2 98%   BMI 24.51 kg/m²     RIGHT ANKLE:  There is NO swelling noted at the ankle  Range of motion intact with dorsiflexion and plantarflexion, inversion and eversion  There is NO tenderness of the ATFL, CF or PTF ligament  There is NO tenderness of the lateral malleolus or medial malleolus  She does have tenderness of the posterior malleolus  She did have some tenderness along " posterior malleolus and the trajectory of the peroneal tendons  Anterior drawer testing is NEGATIVE  Talar tilt testing is NEGATIVE  The foot and ankle is otherwise neurovascularly intact    RIGHT FOOT:  There is NO swelling noted at the foot  There is NO tenderness at the base of the fifth metatarsal, cuboid, or tarsal navicular  There is NO pain with metatarsal squeeze test    NEUTRAL stance  Able to ambulate with NORMAL gait    1. Right ankle injury, initial encounter        2. Contusion of bone          Date of injury, delayed December 2024  Mechanism, taking a snowboard lesson and was trying to avoid crashing into people so she tried to stop    Fitted with functional ankle brace   Provided with ankle exercise program    Provided with work note to allow it to be seated for 15 minutes/h or ad jessica. is much as she can during her shift until reevaluation in 2 weeks    Recommend nonweightbearing for a minimum of 3 weeks     Return in about 22 days (around 3/19/2025).  To see how she has been doing after couple of days weightbearing at that point            2/3/2025 4:50 PM     HISTORY/REASON FOR EXAM:  R ankle sprain.        TECHNIQUE/EXAM DESCRIPTION:  MRI of the RIGHT ankle without contrast.     The study was performed on a [employer] Sign[employer] 1.5 Ch[employer]ita MRI scanner. T1 axial and sagittal, fast spin-echo T2 fat-suppressed axial and coronal, and fast inversion recovery sagittal images were obtained.     COMPARISON:  Radiographs dated 1/31/2025     FINDINGS:  There is no fracture, dislocation, or evidence for osteonecrosis. 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     The flexor, extensor, and peroneal tendons are intact. The Achilles tendon and the visualized portion of the plantar aponeurosis are also intact and normal in appearance.     There is thickening and intermediate signal of the anterior tibiofibular ligament consistent with a moderate sprain. There is bone marrow edema within the  anterior fibula in this region. There is also bone marrow edema within the posterolateral tibia at   the insertion of the posterior tibiofibular ligament which may be due to some partial tearing at the attachment. There is a moderate sprain/partial tear of the anterior talofibular ligament. The posterior talofibular ligament and calcaneofibular ligament   are intact. The deltoid ligament is also intact. Components of the spring ligament appear intact as well.     The sinus tarsi fat is preserved. No abnormality is appreciated in the tarsal tunnel.     There is a small tibiotalar joint effusion.     IMPRESSION:        1. There is no fracture, dislocation, or evidence for osteonecrosis.  2. No tendon tear is evident.  3. Moderate sprain/partial tears of the anterior tibiofibular and anterior talofibular ligaments with possible partial tearing at the tibial attachment of the posterior tibiofibular ligament.        Exam Ended: 02/03/25  5:52 PM Last Resulted: 02/04/25  8:09 AM           1/31/2025 3:36 PM     HISTORY/REASON FOR EXAM:  Pain/Deformity Following Trauma     TECHNIQUE/EXAM DESCRIPTION AND NUMBER OF VIEWS:  3 views of the RIGHT ankle.     COMPARISON: None.     FINDINGS:  No acute fracture or dislocation. The ankle mortise is intact.     No joint arthropathy.     IMPRESSION:     No acute osseous abnormality.      Thank you YOON Collado for allowing me to participate in caring for your patient.

## 2025-02-25 NOTE — LETTER
February 25, 2025         Patient: Cheryl Pimentel   YOB: 1990   Date of Visit: 2/25/2025           To Whom it May Concern:    Cheryl Pimentel was seen in my clinic on 2/25/2025. She has been recommended to avoid weightbearing on her RIGHT lower extremity for 3 weeks.      If you have any questions or concerns, please don't hesitate to call.        Sincerely,           Coleman Howe M.D.  Electronically Signed

## 2025-03-19 ENCOUNTER — HOSPITAL ENCOUNTER (OUTPATIENT)
Dept: RADIOLOGY | Facility: MEDICAL CENTER | Age: 35
End: 2025-03-19
Attending: PHYSICIAN ASSISTANT
Payer: COMMERCIAL

## 2025-03-19 DIAGNOSIS — R10.2 PELVIC PAIN: ICD-10-CM

## 2025-03-19 DIAGNOSIS — Z87.42 HX OF OVARIAN CYST: ICD-10-CM

## 2025-03-19 PROCEDURE — 76830 TRANSVAGINAL US NON-OB: CPT

## 2025-03-20 ENCOUNTER — OFFICE VISIT (OUTPATIENT)
Dept: SPORTS MEDICINE | Facility: OTHER | Age: 35
End: 2025-03-20
Payer: COMMERCIAL

## 2025-03-20 VITALS — WEIGHT: 134 LBS | BODY MASS INDEX: 24.66 KG/M2 | HEIGHT: 62 IN

## 2025-03-20 DIAGNOSIS — S99.911A RIGHT ANKLE INJURY, INITIAL ENCOUNTER: ICD-10-CM

## 2025-03-20 DIAGNOSIS — T14.8XXA CONTUSION OF BONE: ICD-10-CM

## 2025-03-20 PROCEDURE — 99213 OFFICE O/P EST LOW 20 MIN: CPT | Performed by: FAMILY MEDICINE

## 2025-03-20 ASSESSMENT — FIBROSIS 4 INDEX: FIB4 SCORE: 0.55

## 2025-03-20 NOTE — PROGRESS NOTES
"Chief Complaint   Patient presents with    Ankle Injury     R ankle injury      Subjective     Referred by YOON Collado  for evaluation of RIGHT ankle pain  Date of injury, delayed December 2024  Mechanism, taking a snowboard lesson and was trying to avoid crashing into people so she tried to stop  Overrotated during her stop  No pop or snap at the time of injury  She was getting up from a seated position and rotating her board when it happened  Sudden sharp pain normally in the region of the RIGHT ankle at the lateral ankle  She had to take a ride with  to the clinic onsite  Today, pain is predominantly along the lateral ankle  Pain is 5 out of 10   Worse with ambulation/weightbearing  Seen in urgent care  Had x-rays and referred for further evaluation management  She did undergo MRI as well  She has tried Advil which is helping  Compression wrap provided at urgent care    UPDATE:  Tolerating weightbearing    Works at [employer] at [employer], standing for her entire shift  Activities include walking, hiking and cycling    Objective   Ht 1.575 m (5' 2\")   Wt 60.8 kg (134 lb)   BMI 24.51 kg/m²     RIGHT ANKLE:  There is NO swelling noted at the ankle  Range of motion intact with dorsiflexion and plantarflexion, inversion and eversion  There is NO tenderness of the ATFL, CF or PTF ligament  There is NO tenderness of the lateral malleolus or medial malleolus  She does have tenderness of the posterior malleolus  She did have some tenderness along posterior malleolus and the trajectory of the peroneal tendons  Anterior drawer testing is NEGATIVE  Talar tilt testing is NEGATIVE  The foot and ankle is otherwise neurovascularly intact    RIGHT FOOT:  There is NO swelling noted at the foot  There is NO tenderness at the base of the fifth metatarsal, cuboid, or tarsal navicular  There is NO pain with metatarsal squeeze test    NEUTRAL stance  Able to ambulate with NORMAL gait    1. Right ankle injury, " initial encounter        2. Contusion of bone          Date of injury, delayed December 2024  Mechanism, taking a snowboard lesson and was trying to avoid crashing into people so she tried to stop    Fitted with functional ankle brace   Provided with ankle exercise program    Provided with work note to allow it to be seated for 15 minutes/h or ad jessica. is much as she can during her shift until reevaluation in 2 weeks    After nonweightbearing for 3 weeks she is BETTER    Encouraged to continue home exercise program  Balance training  Recommend core strengthening    Follow-up as needed            2/3/2025 4:50 PM     HISTORY/REASON FOR EXAM:  R ankle sprain.        TECHNIQUE/EXAM DESCRIPTION:  MRI of the RIGHT ankle without contrast.     The study was performed on a [employer] 1.5 [employer] MRI scanner. T1 axial and sagittal, fast spin-echo T2 fat-suppressed axial and coronal, and fast inversion recovery sagittal images were obtained.     COMPARISON:  Radiographs dated 1/31/2025     FINDINGS:  There is no fracture, dislocation, or evidence for osteonecrosis. 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     The flexor, extensor, and peroneal tendons are intact. The Achilles tendon and the visualized portion of the plantar aponeurosis are also intact and normal in appearance.     There is thickening and intermediate signal of the anterior tibiofibular ligament consistent with a moderate sprain. There is bone marrow edema within the anterior fibula in this region. There is also bone marrow edema within the posterolateral tibia at   the insertion of the posterior tibiofibular ligament which may be due to some partial tearing at the attachment. There is a moderate sprain/partial tear of the anterior talofibular ligament. The posterior talofibular ligament and calcaneofibular ligament   are intact. The deltoid ligament is also intact. Components of the spring ligament appear intact as well.     The  sinus tarsi fat is preserved. No abnormality is appreciated in the tarsal tunnel.     There is a small tibiotalar joint effusion.     IMPRESSION:        1. There is no fracture, dislocation, or evidence for osteonecrosis.  2. No tendon tear is evident.  3. Moderate sprain/partial tears of the anterior tibiofibular and anterior talofibular ligaments with possible partial tearing at the tibial attachment of the posterior tibiofibular ligament.        Exam Ended: 02/03/25  5:52 PM Last Resulted: 02/04/25  8:09 AM           1/31/2025 3:36 PM     HISTORY/REASON FOR EXAM:  Pain/Deformity Following Trauma     TECHNIQUE/EXAM DESCRIPTION AND NUMBER OF VIEWS:  3 views of the RIGHT ankle.     COMPARISON: None.     FINDINGS:  No acute fracture or dislocation. The ankle mortise is intact.     No joint arthropathy.     IMPRESSION:     No acute osseous abnormality.      Thank you YOON Collado for allowing me to participate in caring for your patient.

## 2025-03-21 ENCOUNTER — RESULTS FOLLOW-UP (OUTPATIENT)
Dept: OBGYN | Facility: CLINIC | Age: 35
End: 2025-03-21

## 2025-04-25 ENCOUNTER — OFFICE VISIT (OUTPATIENT)
Dept: MEDICAL GROUP | Facility: MEDICAL CENTER | Age: 35
End: 2025-04-25
Payer: COMMERCIAL

## 2025-04-25 ENCOUNTER — HOSPITAL ENCOUNTER (OUTPATIENT)
Facility: MEDICAL CENTER | Age: 35
End: 2025-04-25
Attending: STUDENT IN AN ORGANIZED HEALTH CARE EDUCATION/TRAINING PROGRAM
Payer: COMMERCIAL

## 2025-04-25 VITALS
BODY MASS INDEX: 24.82 KG/M2 | OXYGEN SATURATION: 96 % | WEIGHT: 134.9 LBS | DIASTOLIC BLOOD PRESSURE: 60 MMHG | TEMPERATURE: 97.8 F | HEIGHT: 62 IN | HEART RATE: 102 BPM | SYSTOLIC BLOOD PRESSURE: 118 MMHG

## 2025-04-25 DIAGNOSIS — J98.01 BRONCHOSPASM: ICD-10-CM

## 2025-04-25 DIAGNOSIS — Z11.3 SCREENING EXAMINATION FOR SEXUALLY TRANSMITTED DISEASE: ICD-10-CM

## 2025-04-25 DIAGNOSIS — Z91.09 ENVIRONMENTAL ALLERGIES: ICD-10-CM

## 2025-04-25 DIAGNOSIS — Z20.2 POSSIBLE EXPOSURE TO STD: ICD-10-CM

## 2025-04-25 DIAGNOSIS — E10.9 TYPE 1 DIABETES MELLITUS WITHOUT COMPLICATION (HCC): Primary | ICD-10-CM

## 2025-04-25 DIAGNOSIS — Z23 NEED FOR VACCINATION: ICD-10-CM

## 2025-04-25 DIAGNOSIS — R05.2 SUBACUTE COUGH: ICD-10-CM

## 2025-04-25 DIAGNOSIS — E10.9 TYPE 1 DIABETES MELLITUS WITHOUT COMPLICATION (HCC): ICD-10-CM

## 2025-04-25 PROBLEM — F41.1 GAD (GENERALIZED ANXIETY DISORDER): Status: ACTIVE | Noted: 2023-07-17

## 2025-04-25 PROCEDURE — 90471 IMMUNIZATION ADMIN: CPT | Performed by: STUDENT IN AN ORGANIZED HEALTH CARE EDUCATION/TRAINING PROGRAM

## 2025-04-25 PROCEDURE — 99214 OFFICE O/P EST MOD 30 MIN: CPT | Mod: 25 | Performed by: STUDENT IN AN ORGANIZED HEALTH CARE EDUCATION/TRAINING PROGRAM

## 2025-04-25 PROCEDURE — 3078F DIAST BP <80 MM HG: CPT | Performed by: STUDENT IN AN ORGANIZED HEALTH CARE EDUCATION/TRAINING PROGRAM

## 2025-04-25 PROCEDURE — 90677 PCV20 VACCINE IM: CPT | Performed by: STUDENT IN AN ORGANIZED HEALTH CARE EDUCATION/TRAINING PROGRAM

## 2025-04-25 PROCEDURE — 82043 UR ALBUMIN QUANTITATIVE: CPT

## 2025-04-25 PROCEDURE — 3074F SYST BP LT 130 MM HG: CPT | Performed by: STUDENT IN AN ORGANIZED HEALTH CARE EDUCATION/TRAINING PROGRAM

## 2025-04-25 PROCEDURE — 82570 ASSAY OF URINE CREATININE: CPT

## 2025-04-25 RX ORDER — INSULIN LISPRO 100 [IU]/ML
30 INJECTION, SOLUTION INTRAVENOUS; SUBCUTANEOUS
Qty: 15 ML | Refills: 0 | Status: SHIPPED | OUTPATIENT
Start: 2025-04-25

## 2025-04-25 RX ORDER — ALBUTEROL SULFATE 90 UG/1
2 INHALANT RESPIRATORY (INHALATION) EVERY 4 HOURS PRN
Qty: 1 EACH | Refills: 0 | Status: SHIPPED | OUTPATIENT
Start: 2025-04-25

## 2025-04-25 ASSESSMENT — PATIENT HEALTH QUESTIONNAIRE - PHQ9: CLINICAL INTERPRETATION OF PHQ2 SCORE: 0

## 2025-04-25 ASSESSMENT — FIBROSIS 4 INDEX: FIB4 SCORE: 0.56

## 2025-04-26 LAB
CREAT UR-MCNC: 53 MG/DL
MICROALBUMIN UR-MCNC: <1.2 MG/DL
MICROALBUMIN/CREAT UR: NORMAL MG/G (ref 0–30)

## 2025-04-28 PROBLEM — R05.2 SUBACUTE COUGH: Status: ACTIVE | Noted: 2025-04-28

## 2025-04-29 ENCOUNTER — HOSPITAL ENCOUNTER (OUTPATIENT)
Dept: LAB | Facility: MEDICAL CENTER | Age: 35
End: 2025-04-29
Attending: STUDENT IN AN ORGANIZED HEALTH CARE EDUCATION/TRAINING PROGRAM
Payer: COMMERCIAL

## 2025-04-29 DIAGNOSIS — Z20.2 POSSIBLE EXPOSURE TO STD: ICD-10-CM

## 2025-04-29 DIAGNOSIS — Z11.3 SCREENING EXAMINATION FOR SEXUALLY TRANSMITTED DISEASE: ICD-10-CM

## 2025-04-29 LAB
HBV CORE AB SERPL QL IA: NONREACTIVE
HBV SURFACE AB SERPL IA-ACNC: 5619 MIU/ML (ref 0–10)
HBV SURFACE AG SER QL: NORMAL
HCV AB SER QL: NORMAL
HIV 1+2 AB+HIV1 P24 AG SERPL QL IA: NORMAL
T PALLIDUM AB SER QL IA: NORMAL

## 2025-04-29 PROCEDURE — 86704 HEP B CORE ANTIBODY TOTAL: CPT

## 2025-04-29 PROCEDURE — 87340 HEPATITIS B SURFACE AG IA: CPT

## 2025-04-29 PROCEDURE — 87389 HIV-1 AG W/HIV-1&-2 AB AG IA: CPT

## 2025-04-29 PROCEDURE — 86780 TREPONEMA PALLIDUM: CPT

## 2025-04-29 PROCEDURE — 86706 HEP B SURFACE ANTIBODY: CPT

## 2025-04-29 PROCEDURE — 87591 N.GONORRHOEAE DNA AMP PROB: CPT

## 2025-04-29 PROCEDURE — 87491 CHLMYD TRACH DNA AMP PROBE: CPT

## 2025-04-29 PROCEDURE — 36415 COLL VENOUS BLD VENIPUNCTURE: CPT

## 2025-04-29 PROCEDURE — 86803 HEPATITIS C AB TEST: CPT

## 2025-04-29 NOTE — PROGRESS NOTES
Subjective:     CC:   Chief Complaint   Patient presents with    Allergy Testing     Wants to get allergy testing     Requesting Labs     STD panel        HPI:   April presents today with  Verbal consent was acquired by the patient to use [employer] ambient listening note generation during this visit Yes   History of Present Illness  The patient presents for evaluation of diabetes and environmental allergies.    Diabetes management is currently overseen by an endocrinologist, with a scheduled appointment on 05/01/2025. Continuous glucose monitoring is facilitated by a Dexcom device, and efforts are being made to maintain optimal blood sugar levels. Hypoglycemic episodes have been experienced due to dietary changes during a recent international trip. Upon return, compensation was attempted by intermittently adhering to a low-carbohydrate diet. Insulin pump therapy, specifically Humalog, is utilized, but difficulties in obtaining the prescription from the pharmacy have been encountered. Consequently, her 's insulin pen has been used as an alternative.    Respiratory distress, characterized by coughing fits, has been experienced, potentially due to allergen exposure during yard work and travel. The severity of symptoms necessitated a visit to an urgent care facility in [place]. Despite not being diagnosed with bronchitis, strep, COVID-19, or influenza, Mucinex and benzonatate were prescribed, providing immediate relief. However, a recurrence of symptoms occurred two nights ago, prompting consideration of allergies. Uncertainty exists regarding specific triggers, with suspicions including gardening, detergent use, or dust exposure.    Results         Health Maintenance: Completed    ROS:  ROS    Review of systems unremarkable except for concerns noted by patient or items listed.    Please see HPI for additional ROS.      Objective:     Exam:  /60 (BP Location: Left arm, Patient Position: Sitting, BP  "Cuff Size: Adult)   Pulse (!) 102   Temp 36.6 °C (97.8 °F) (Temporal)   Ht 1.575 m (5' 2\")   Wt 61.2 kg (134 lb 14.4 oz)   LMP 04/01/2025   SpO2 96%   BMI 24.67 kg/m²  Body mass index is 24.67 kg/m².    Physical Exam  Constitutional:       Appearance: Normal appearance.   HENT:      Head: Normocephalic.   Eyes:      General: No scleral icterus.  Cardiovascular:      Rate and Rhythm: Normal rate and regular rhythm.      Pulses: Normal pulses.      Heart sounds: Normal heart sounds.   Pulmonary:      Effort: Pulmonary effort is normal.      Breath sounds: Normal breath sounds.   Musculoskeletal:      Right lower leg: No edema.      Left lower leg: No edema.   Skin:     General: Skin is warm.   Neurological:      Mental Status: She is alert and oriented to person, place, and time.   Psychiatric:         Mood and Affect: Mood normal.         Behavior: Behavior normal.             Labs: reviewed    Assessment & Plan:     35 y.o. female with the following -     1. Type 1 diabetes mellitus without complication (HCC) (Primary)   Chronic,uncontrolled  A1c 8.2   - Blood sugars fluctuating due to recent travel and dietary changes  - Dexcom used for continuous glucose monitoring    Plan:   Continue Current medications   Continue f/u with endocrinology   Micro albumin cr ratio - due , ordered urine test   - Prescription for Humalog, up to 30 units daily, issued and sent to [facility]  As [facility] is out of stock and patient is currently using her spouse's insulin    Recommended :  - Annual eye exam  - Regular foot exam  - dietary modification - low carbohydrate diet   - regular exercise, weight loss  - Educated patient on meal planning, ideal carb controlled diet, exercise and weight loss  - Discussed prevention and management of hypoglycemia  - Side effects of all medications discussed with patient  - Follow up in 6 months       - MICROALBUMIN CREAT RATIO URINE; Future  - insulin lispro 100 UNIT/ML SC SOPN injection PEN; " Inject 30 Units under the skin 4 Times a Day,Before Meals and at Bedtime.  Dispense: 15 mL; Refill: 0    2. Need for vaccination  - Pneumococcal Conjugate Vaccine 20-Valent (6 wks+)  - MICROALBUMIN CREAT RATIO URINE; Future    3. Environmental allergies  Chronic, stable  As needed antihistamine over-the-counter once a day  - albuterol 108 (90 Base) MCG/ACT Aero Soln inhalation aerosol; Inhale 2 Puffs every four hours as needed for Shortness of Breath.  Dispense: 1 Each; Refill: 0  - Referral to Allergy    4.  Persistent cough  Recent acute upper respiratory infection earlier this month.    Patient reporting new onset symptoms of mild chest tightness with wheezing and coughing episodes.  She was recently seen at [facility] for acute bronchitis.  Negative for respiratory testing including COVID and flu.  Patient was prescribed Tessalon Perles and cough medication Mucinex DM which did help slightly with her symptoms of cough but has restarted  Possible bronchospasm with exposure to environmental allergies.  I will give her albuterol inhaler to be used as needed if she feels any chest tightness or shortness of breath.  If symptoms persist need further evaluation with PFTs  - albuterol 108 (90 Base) MCG/ACT Aero Soln inhalation aerosol; Inhale 2 Puffs every four hours as needed for Shortness of Breath.  Dispense: 1 Each; Refill: 0  - Referral to Allergy.  Patient wants allergy testing    Other possible etiology could be GERD.  Though patient does not have obvious epigastric discomfort on acid reflux symptoms.  Is possible to have overnight acid reflux causing irritation of throat leading to dry cough.  If her symptoms persist we will pursue treatment for possible GERD with trial of PPI for 2 months      5. Screening examination for sexually transmitted disease  6.  Possible exposure to STD  - HIV AG/AB Combo Assay Screening; Future  - T.Pallidum AB MINDA (Screening); Future  - Trichomonas Vaginalis by TMA  - Hepatitis C  Virus Antibody; Future  - HEP B Surface Antibody; Future  - Hep B Core AB Total; Future  - Hep B Surface Antigen; Future  - Chlamydia/GC, PCR (Urine); Future      3. Health Maintenance:  - COVID-19 booster advised to be received at the pharmacy  - Pneumonia vaccine to be administered today  - Tetanus vaccine scheduled for 10/2026  - STD screening to be conducted    4. Medication Management:  - Pharmacy out of insulin; prescription sent to [facility]  - Humalog (insulin lispro) prescribed for use with the pump, up to 30 units daily  - Pharmacy network checked to ensure coverage with [facility]    Please note that this dictation was created using voice recognition software. I have made every reasonable attempt to correct obvious errors, but I expect that there are errors of grammar and possibly content that I did not discover before finalizing the note.

## 2025-04-30 LAB
C TRACH DNA SPEC QL NAA+PROBE: NEGATIVE
N GONORRHOEA DNA SPEC QL NAA+PROBE: NEGATIVE
SPECIMEN SOURCE: NORMAL

## 2025-04-30 NOTE — Clinical Note
REFERRAL APPROVAL NOTICE         Sent on April 30, 2025 April Melisa Pimentel  [address] [place] [address]                   Dear Ms. Pimentel,    After a careful review of the medical information and benefit coverage, [facility] has processed your referral. See below for additional details.    If applicable, you must be actively enrolled with your insurance for coverage of the authorized service. If you have any questions regarding your coverage, please contact your insurance directly.    REFERRAL INFORMATION   Referral #:  [id number]  Referred-To Provider    Referred-By Provider:  Allergy and Immunology    Rhonda Garcia M.D.   [facility]      [address]  [address]  [place] [id number]  [phone] [address]  [place] [address]  [phone]    Referral Start Date:  04/25/2025  Referral End Date:   04/25/2026             SCHEDULING  If you do not already have an appointment, please call [phone] to make an appointment.     MORE INFORMATION  If you do not already have a [employer] account, sign up at: [email or web address]  You can access your medical information, make appointments, see lab results, billing information, and more.  If you have questions regarding this referral, please contact  the [facility] Referrals department at:             [phone]. Monday - Friday 8:00AM - 5:00PM.     Sincerely,    [facility]

## 2025-05-01 ENCOUNTER — OFFICE VISIT (OUTPATIENT)
Dept: ENDOCRINOLOGY | Facility: MEDICAL CENTER | Age: 35
End: 2025-05-01
Attending: STUDENT IN AN ORGANIZED HEALTH CARE EDUCATION/TRAINING PROGRAM
Payer: COMMERCIAL

## 2025-05-01 VITALS
DIASTOLIC BLOOD PRESSURE: 76 MMHG | OXYGEN SATURATION: 97 % | WEIGHT: 135.9 LBS | SYSTOLIC BLOOD PRESSURE: 114 MMHG | BODY MASS INDEX: 25.01 KG/M2 | HEART RATE: 97 BPM | HEIGHT: 62 IN

## 2025-05-01 DIAGNOSIS — E10.65 TYPE 1 DIABETES MELLITUS WITH HYPERGLYCEMIA (HCC): ICD-10-CM

## 2025-05-01 PROCEDURE — 95251 CONT GLUC MNTR ANALYSIS I&R: CPT | Performed by: STUDENT IN AN ORGANIZED HEALTH CARE EDUCATION/TRAINING PROGRAM

## 2025-05-01 PROCEDURE — 99212 OFFICE O/P EST SF 10 MIN: CPT | Performed by: STUDENT IN AN ORGANIZED HEALTH CARE EDUCATION/TRAINING PROGRAM

## 2025-05-01 PROCEDURE — 99205 OFFICE O/P NEW HI 60 MIN: CPT | Performed by: STUDENT IN AN ORGANIZED HEALTH CARE EDUCATION/TRAINING PROGRAM

## 2025-05-01 PROCEDURE — 95250 CONT GLUC MNTR PHYS/QHP EQP: CPT | Performed by: STUDENT IN AN ORGANIZED HEALTH CARE EDUCATION/TRAINING PROGRAM

## 2025-05-01 PROCEDURE — 3074F SYST BP LT 130 MM HG: CPT | Performed by: STUDENT IN AN ORGANIZED HEALTH CARE EDUCATION/TRAINING PROGRAM

## 2025-05-01 PROCEDURE — 3078F DIAST BP <80 MM HG: CPT | Performed by: STUDENT IN AN ORGANIZED HEALTH CARE EDUCATION/TRAINING PROGRAM

## 2025-05-01 ASSESSMENT — FIBROSIS 4 INDEX: FIB4 SCORE: 0.56

## 2025-05-01 NOTE — PROGRESS NOTES
"Chief Complaint:  Consult requested by Rhonda Garcia M.D. for initial evaluation of Type 2 Diabetes Mellitus    HPI:   Cheryl Pimentel is a 35 y.o. female was referred to endocrinology service by PCP to establish care and T2DM management. Previously was managed by endocrinologist     DM history:  - diagnosed 3/2000, presented with DKA  - hospitalizations for DKA/HHS/severe hyperglycemia/severe hypoglycemia in the past: 2 episodes of severe hypoglycemia  - prior therapy: MDI -> [employer]  -> Dexcom G7 + iLet since  6/2024  - known DM complications:   - latest HbA1C   - pertinent PMHx:   -- dyslipidemia, MURIEL  -- denies NAFLD, HTN; CAD/PAD/CHF/ CVA   - pertinent FHx:  DM 1: no     As per prior endocrinologist:   - used pump in the past but pump supplies were expensive  - declined  InPen  - stopped using [place] 3 as it hurt her arm, has ulnar neuropathyin L arm  - insurance didn't cover Tresiba in 8/2023  - on iLet insulin pump since 6/2024   - had changed her diet to low carb at that time and was advised by iLet rep she needs to do a factory reset for the pump to relearn her insulin requirements.   But since then, she has stopped doing low carb diet and is currently eating some carbs with her meals.     had changed her diet to low carb at that time and was advised by iLet rep she needs to do a factory reset for the pump to relearn her insulin requirements.   But since then, she has stopped doing low carb diet and is currently eating some carbs with her meals.     iLet rep advised her not to announce her meal if eating <10g cho, but if she eats 30g cho, then announce meal as \"usual.\"     Breakfast usual: 67%  Lunch usual: 80%.  Dinner usual: 93%.     Current therapy:  Lispro 30 units TID AC?  Glargine      Tolerates medications: well  Compliant: yes    Prior used medications:       Other important medications:  Ferrous sulfate 325 mg q 48 hrs    BG control:  CGM type -  Period -   Data were reviewed and discussed " with the patient  Active time   ABG  GV  GMI   TIR  TAR  TBR    Exercise:  hiking    Past Medical History:  Patient Active Problem List    Diagnosis Date Noted    Subacute cough 04/28/2025    Transfusion of blood product refused for Orthodox reason 10/29/2024    Hyperlipemia 10/29/2024    Grief reaction 07/17/2023    MURIEL (generalized anxiety disorder) 07/17/2023    Insulin pump in place 06/15/2016    Type 1 diabetes (HCC) 09/25/2013     Past Surgical History:  No past surgical history on file.     Allergies:  Patient has no known allergies.     Social History:  Social History     Socioeconomic History    Marital status:      Spouse name: Not on file    Number of children: Not on file    Years of education: Not on file    Highest education level: Not on file   Occupational History    Not on file   Tobacco Use    Smoking status: Never    Smokeless tobacco: Never   Substance and Sexual Activity    Alcohol use: Not Currently    Drug use: Never    Sexual activity: Not Currently     Partners: Male   Other Topics Concern    Not on file   Social History Narrative    Not on file     Social Drivers of Health     Financial Resource Strain: Not on file   Food Insecurity: Not on file   Transportation Needs: Not on file   Physical Activity: Not on file   Stress: Not on file   Social Connections: Not on file   Intimate Partner Violence: Not on file   Housing Stability: Not on file      Family History:   Family History   Problem Relation Age of Onset    Hypertension Mother     Diabetes Father     Hypertension Father     Breast Cancer Paternal Aunt     Breast Cancer Other      Current Medications:  Current Outpatient Medications:     albuterol 108 (90 Base) MCG/ACT Aero Soln inhalation aerosol, Inhale 2 Puffs every four hours as needed for Shortness of Breath., Disp: 1 Each, Rfl: 0    insulin lispro 100 UNIT/ML SC SOPN injection PEN, Inject 30 Units under the skin 4 Times a Day,Before Meals and at Bedtime., Disp: 15 mL, Rfl:  "0    Glucagon, rDNA, (GLUCAGON EMERGENCY) 1 MG Kit, USE AS DIRECTED FOR LOW BLOOD SUGAR., Disp: , Rfl:     insulin glargine (LANTUS) 100 UNIT/ML SC SOLN, , Disp: , Rfl:     ferrous sulfate 325 (65 Fe) MG tablet, TAKE 1 TABLET BY MOUTH EVERY 48 HOURS., Disp: 45 Tablet, Rfl: 1    cyclobenzaprine (FLEXERIL) 5 mg tablet, TAKE 1 TABLET BY MOUTH DAILY AS NEEDED FOR MUSCLE SPASMS., Disp: , Rfl:     MAGNESIUM PO, Take  by mouth., Disp: , Rfl:     Continuous Glucose Sensor (DEXCOM G7 SENSOR) Misc, REPLACE EVERY 10 DAYS AND AS NEEDED FOR SENSOR FAILURE., Disp: , Rfl:     PHYSICAL EXAM:   Vital signs: /76   Pulse 97   Ht 1.575 m (5' 2\")   Wt 61.6 kg (135 lb 14.4 oz)   LMP 04/01/2025   SpO2 97%   BMI 24.86 kg/m²   GENERAL: Well-developed, well-nourished  in no apparent distress.   EYE: No ocular and eyelid asymmetry, Anicteric sclerae,  PERRL, No exophthalmos or lidlag  HENT: Hearing grossly intact, Normocephalic, atraumatic.  NECK: Supple. Trachea midline.   CARDIOVASCULAR: Regular rate and rhythm.   LUNGS: No dyspnea  ABDOMEN: Soft, nondistended  EXTREMITIES: No clubbing, cyanosis, or edema.   NEUROLOGICAL: Cranial nerves II-XII are grossly intact No visible tremor with both outstretched hands  SKIN: No rashes, lesions. Turgor is normal.    Labs:  - reviewed  HbA1C/BG/Hb:   Latest Reference Range & Units 07/10/24 10:33 10/29/24 09:50 02/18/25 13:55   Glycohemoglobin 4.8 - 5.6 % 8.1 (H) (E) 7.3 ! 8.2 (H) (E)   (H): Data is abnormally high  !: Data is abnormal  (E): External lab result   Latest Reference Range & Units 10/29/24 10:44   Hemoglobin 12.0 - 16.0 g/dL 12.6   Hematocrit 37.0 - 47.0 % 39.0       C-peptide/glucose/T1DM Abs:  No data    Kidney function/MACR:   Latest Reference Range & Units 10/29/24 10:44 04/25/25 17:03   Creatinine 0.50 - 1.40 mg/dL 0.67    GFR (CKD-EPI) >60 mL/min/1.73 m 2 117    Micro Alb Creat Ratio 0 - 30 mg/g  see below     LFTs/FIB-4:   Latest Reference Range & Units 10/29/24 10:44 " 02/18/25 13:55   Platelet Count 164 - 446 K/uL 288    AST(SGOT) 0 - 40 IU/L 15 18 (E)   ALT(SGPT) 0 - 32 IU/L 15 15 (E)   Alkaline Phosphatase 30 - 99 U/L 49    (E): External lab result    Lipid panel:   Latest Reference Range & Units 10/29/24 10:44   Triglycerides 0 - 149 mg/dL 61   HDL >=40 mg/dL 79   LDL <100 mg/dL 88     Hypothyroidism screening:   Latest Reference Range & Units 10/29/24 10:44   TSH 0.380 - 5.330 uIU/mL 2.030       ASSESSMENT/PLAN:   # Type 1 diabetes with hyperglycemia  - duration x   - on  - HbA1C      Microvascular complications: denies peripheral neuropathy, nephropathy, retinopathy  Macrovascular complications:   Associated comorbidities:      DM complication screening:  Labs reviewed:  MACR - neg, last checked in   Creat/GFR , last checked in  LDL - 99 - not at target, last checked in      Patient is taking statin: on   Patient is taking ASA:   Patient is taking ACE/ARB: on      Plan:  Discussed with the patient goals for DM management:  Fasting BG 90 - 130  2 hrs postprandial  - 180  HbA1C < 7.0%     Therapy adjustments:  - none    3.  Continue use CGM  4. Given instructions for foot care  5. Discussed hypoglycemia symptoms, management, given glucagon prescription.     - Comp Metabolic Panel; Future  - CBC WITH DIFFERENTIAL; Future  - HEMOGLOBIN A1C; Future  - MICROALBUMIN CREAT RATIO URINE; Future  - Lipid Profile; Future  - TSH WITH REFLEX TO FT4; Future  - Glucagon (BAQSIMI ONE PACK) 3 MG/DOSE; Administer 1 Spray into one nostril 1 time a day as needed (for severe hypoglycemia).  Dispense: 1 Each; Refill: 11  - insulin lispro 100 UNIT/ML SC SOPN injection PEN; Inject 100 Units under the skin continuous. To use with insulin pump  Dispense: 100 mL; Refill: 4  - insulin glargine (LANTUS SOLOSTAR) 100 UNIT/ML Solution Pen-injector injection; Inject 40 Units under the skin every evening.  Dispense: 6 mL; Refill: 4    RTC: 3 mo    Total time (face-to-face and non-face-to face time):   60 min - not including CGM download and review - extensive discussion of diagnoses, treatment, prognosis, medical charts, lab review, documentation.  Plan reviewed with the patient and agreed with plan.  All questions answered to patient's satisfaction.  Thank you kindly for allowing me to participate in the diabetes care plan for this patient  Melissa Wilson MD    CC:   Rhonda Garcia M.D.

## 2025-05-12 ENCOUNTER — RESULTS FOLLOW-UP (OUTPATIENT)
Dept: MEDICAL GROUP | Facility: MEDICAL CENTER | Age: 35
End: 2025-05-12
Payer: COMMERCIAL

## 2025-05-12 RX ORDER — INSULIN GLARGINE 100 [IU]/ML
40 INJECTION, SOLUTION SUBCUTANEOUS EVERY EVENING
Qty: 6 ML | Refills: 4 | Status: SHIPPED | OUTPATIENT
Start: 2025-05-12

## 2025-05-12 RX ORDER — GLUCAGON 3 MG/1
3 POWDER NASAL
Qty: 1 EACH | Refills: 11 | Status: SHIPPED | OUTPATIENT
Start: 2025-05-12

## 2025-05-12 RX ORDER — INSULIN LISPRO 100 [IU]/ML
100 INJECTION, SOLUTION INTRAVENOUS; SUBCUTANEOUS CONTINUOUS
Qty: 100 ML | Refills: 4 | Status: SHIPPED | OUTPATIENT
Start: 2025-05-12

## 2025-05-24 DIAGNOSIS — Z91.09 ENVIRONMENTAL ALLERGIES: ICD-10-CM

## 2025-05-24 DIAGNOSIS — J98.01 BRONCHOSPASM: ICD-10-CM

## 2025-05-27 NOTE — TELEPHONE ENCOUNTER
Received request via: Pharmacy    Was the patient seen in the last year in this department? Yes    Does the patient have an active prescription (recently filled or refills available) for medication(s) requested? No    Pharmacy Name: [facility][id number]    Does the patient have group home Plus and need 100-day supply? (This applies to ALL medications) Patient does not have SCP

## 2025-05-28 RX ORDER — ALBUTEROL SULFATE 90 UG/1
2 INHALANT RESPIRATORY (INHALATION) EVERY 4 HOURS PRN
Qty: 6.7 EACH | Refills: 2 | Status: SHIPPED | OUTPATIENT
Start: 2025-05-28

## 2025-06-12 DIAGNOSIS — E10.65 TYPE 1 DIABETES MELLITUS WITH HYPERGLYCEMIA (HCC): Primary | ICD-10-CM

## 2025-06-12 PROCEDURE — RXMED WILLOW AMBULATORY MEDICATION CHARGE: Performed by: STUDENT IN AN ORGANIZED HEALTH CARE EDUCATION/TRAINING PROGRAM

## 2025-06-12 RX ORDER — ACYCLOVIR 400 MG/1
1 TABLET ORAL
Qty: 9 EACH | Refills: 4 | Status: SHIPPED | OUTPATIENT
Start: 2025-06-12

## 2025-06-13 ENCOUNTER — PHARMACY VISIT (OUTPATIENT)
Dept: PHARMACY | Facility: MEDICAL CENTER | Age: 35
End: 2025-06-13
Payer: COMMERCIAL

## 2025-07-07 PROCEDURE — RXMED WILLOW AMBULATORY MEDICATION CHARGE: Performed by: STUDENT IN AN ORGANIZED HEALTH CARE EDUCATION/TRAINING PROGRAM

## 2025-07-09 ENCOUNTER — PHARMACY VISIT (OUTPATIENT)
Dept: PHARMACY | Facility: MEDICAL CENTER | Age: 35
End: 2025-07-09
Payer: COMMERCIAL

## 2025-07-11 DIAGNOSIS — E61.1 IRON DEFICIENCY: ICD-10-CM

## 2025-07-11 NOTE — TELEPHONE ENCOUNTER
Received request via: Pharmacy    Was the patient seen in the last year in this department? Yes    Does the patient have an active prescription (recently filled or refills available) for medication(s) requested? No    [facility]     Does the patient have [facility] Plus and need 100-day supply? (This applies to ALL medications) Patient does not have SCP

## 2025-07-12 RX ORDER — FERROUS SULFATE 325(65) MG
325 TABLET ORAL
Qty: 45 TABLET | Refills: 1 | Status: SHIPPED | OUTPATIENT
Start: 2025-07-12 | End: 2025-07-12

## 2025-08-04 ENCOUNTER — HOSPITAL ENCOUNTER (OUTPATIENT)
Dept: LAB | Facility: MEDICAL CENTER | Age: 35
End: 2025-08-04
Attending: STUDENT IN AN ORGANIZED HEALTH CARE EDUCATION/TRAINING PROGRAM
Payer: COMMERCIAL

## 2025-08-04 DIAGNOSIS — E10.65 TYPE 1 DIABETES MELLITUS WITH HYPERGLYCEMIA (HCC): ICD-10-CM

## 2025-08-04 LAB
ALBUMIN SERPL BCP-MCNC: 4.3 G/DL (ref 3.2–4.9)
ALBUMIN/GLOB SERPL: 1.2 G/DL
ALP SERPL-CCNC: 54 U/L (ref 30–99)
ALT SERPL-CCNC: 15 U/L (ref 2–50)
ANION GAP SERPL CALC-SCNC: 11 MMOL/L (ref 7–16)
AST SERPL-CCNC: 21 U/L (ref 12–45)
BASOPHILS # BLD AUTO: 0.6 % (ref 0–1.8)
BASOPHILS # BLD: 0.03 K/UL (ref 0–0.12)
BILIRUB SERPL-MCNC: 0.3 MG/DL (ref 0.1–1.5)
BUN SERPL-MCNC: 13 MG/DL (ref 8–22)
CALCIUM ALBUM COR SERPL-MCNC: 9.5 MG/DL (ref 8.5–10.5)
CALCIUM SERPL-MCNC: 9.7 MG/DL (ref 8.5–10.5)
CHLORIDE SERPL-SCNC: 99 MMOL/L (ref 96–112)
CHOLEST SERPL-MCNC: 233 MG/DL (ref 100–199)
CO2 SERPL-SCNC: 25 MMOL/L (ref 20–33)
CREAT SERPL-MCNC: 0.83 MG/DL (ref 0.5–1.4)
CREAT UR-MCNC: 19.1 MG/DL
EOSINOPHIL # BLD AUTO: 0.1 K/UL (ref 0–0.51)
EOSINOPHIL NFR BLD: 2.1 % (ref 0–6.9)
ERYTHROCYTE [DISTWIDTH] IN BLOOD BY AUTOMATED COUNT: 37.5 FL (ref 35.9–50)
EST. AVERAGE GLUCOSE BLD GHB EST-MCNC: 174 MG/DL
FASTING STATUS PATIENT QL REPORTED: NORMAL
GFR SERPLBLD CREATININE-BSD FMLA CKD-EPI: 94 ML/MIN/1.73 M 2
GLOBULIN SER CALC-MCNC: 3.5 G/DL (ref 1.9–3.5)
GLUCOSE SERPL-MCNC: 159 MG/DL (ref 65–99)
HBA1C MFR BLD: 7.7 % (ref 4–5.6)
HCT VFR BLD AUTO: 41.4 % (ref 37–47)
HDLC SERPL-MCNC: 77 MG/DL
HGB BLD-MCNC: 13.6 G/DL (ref 12–16)
IMM GRANULOCYTES # BLD AUTO: 0.01 K/UL (ref 0–0.11)
IMM GRANULOCYTES NFR BLD AUTO: 0.2 % (ref 0–0.9)
LDLC SERPL CALC-MCNC: 137 MG/DL
LYMPHOCYTES # BLD AUTO: 1.85 K/UL (ref 1–4.8)
LYMPHOCYTES NFR BLD: 38.5 % (ref 22–41)
MCH RBC QN AUTO: 24.9 PG (ref 27–33)
MCHC RBC AUTO-ENTMCNC: 32.9 G/DL (ref 32.2–35.5)
MCV RBC AUTO: 75.7 FL (ref 81.4–97.8)
MICROALBUMIN UR-MCNC: <1.2 MG/DL
MICROALBUMIN/CREAT UR: NORMAL MG/G (ref 0–30)
MONOCYTES # BLD AUTO: 0.38 K/UL (ref 0–0.85)
MONOCYTES NFR BLD AUTO: 7.9 % (ref 0–13.4)
NEUTROPHILS # BLD AUTO: 2.43 K/UL (ref 1.82–7.42)
NEUTROPHILS NFR BLD: 50.7 % (ref 44–72)
NRBC # BLD AUTO: 0 K/UL
NRBC BLD-RTO: 0 /100 WBC (ref 0–0.2)
PLATELET # BLD AUTO: 305 K/UL (ref 164–446)
PMV BLD AUTO: 10.9 FL (ref 9–12.9)
POTASSIUM SERPL-SCNC: 4 MMOL/L (ref 3.6–5.5)
PROT SERPL-MCNC: 7.8 G/DL (ref 6–8.2)
RBC # BLD AUTO: 5.47 M/UL (ref 4.2–5.4)
SODIUM SERPL-SCNC: 135 MMOL/L (ref 135–145)
TRIGL SERPL-MCNC: 94 MG/DL (ref 0–149)
TSH SERPL DL<=0.005 MIU/L-ACNC: 3.1 UIU/ML (ref 0.38–5.33)
WBC # BLD AUTO: 4.8 K/UL (ref 4.8–10.8)

## 2025-08-04 PROCEDURE — 36415 COLL VENOUS BLD VENIPUNCTURE: CPT

## 2025-08-04 PROCEDURE — 83036 HEMOGLOBIN GLYCOSYLATED A1C: CPT

## 2025-08-04 PROCEDURE — 84443 ASSAY THYROID STIM HORMONE: CPT

## 2025-08-04 PROCEDURE — 82570 ASSAY OF URINE CREATININE: CPT

## 2025-08-04 PROCEDURE — 80053 COMPREHEN METABOLIC PANEL: CPT

## 2025-08-04 PROCEDURE — 85025 COMPLETE CBC W/AUTO DIFF WBC: CPT

## 2025-08-04 PROCEDURE — 82043 UR ALBUMIN QUANTITATIVE: CPT

## 2025-08-04 PROCEDURE — 80061 LIPID PANEL: CPT

## 2025-08-07 ENCOUNTER — OFFICE VISIT (OUTPATIENT)
Dept: ENDOCRINOLOGY | Facility: MEDICAL CENTER | Age: 35
End: 2025-08-07
Attending: STUDENT IN AN ORGANIZED HEALTH CARE EDUCATION/TRAINING PROGRAM
Payer: COMMERCIAL

## 2025-08-07 VITALS
OXYGEN SATURATION: 98 % | DIASTOLIC BLOOD PRESSURE: 80 MMHG | SYSTOLIC BLOOD PRESSURE: 128 MMHG | HEIGHT: 62 IN | BODY MASS INDEX: 25.76 KG/M2 | WEIGHT: 140 LBS | HEART RATE: 84 BPM

## 2025-08-07 DIAGNOSIS — E78.5 DYSLIPIDEMIA: ICD-10-CM

## 2025-08-07 DIAGNOSIS — E10.65 TYPE 1 DIABETES MELLITUS WITH HYPERGLYCEMIA (HCC): Primary | ICD-10-CM

## 2025-08-07 PROCEDURE — 99215 OFFICE O/P EST HI 40 MIN: CPT | Mod: 25 | Performed by: STUDENT IN AN ORGANIZED HEALTH CARE EDUCATION/TRAINING PROGRAM

## 2025-08-07 PROCEDURE — 3074F SYST BP LT 130 MM HG: CPT | Performed by: STUDENT IN AN ORGANIZED HEALTH CARE EDUCATION/TRAINING PROGRAM

## 2025-08-07 PROCEDURE — 95250 CONT GLUC MNTR PHYS/QHP EQP: CPT | Performed by: STUDENT IN AN ORGANIZED HEALTH CARE EDUCATION/TRAINING PROGRAM

## 2025-08-07 PROCEDURE — 3079F DIAST BP 80-89 MM HG: CPT | Performed by: STUDENT IN AN ORGANIZED HEALTH CARE EDUCATION/TRAINING PROGRAM

## 2025-08-07 PROCEDURE — 95251 CONT GLUC MNTR ANALYSIS I&R: CPT | Performed by: STUDENT IN AN ORGANIZED HEALTH CARE EDUCATION/TRAINING PROGRAM

## 2025-08-07 PROCEDURE — 99213 OFFICE O/P EST LOW 20 MIN: CPT | Performed by: STUDENT IN AN ORGANIZED HEALTH CARE EDUCATION/TRAINING PROGRAM

## 2025-08-07 ASSESSMENT — FIBROSIS 4 INDEX: FIB4 SCORE: 0.62

## 2025-08-11 ENCOUNTER — SPECIALTY PHARMACY (OUTPATIENT)
Dept: ENDOCRINOLOGY | Facility: MEDICAL CENTER | Age: 35
End: 2025-08-11
Payer: COMMERCIAL

## 2025-08-11 DIAGNOSIS — E10.65 TYPE 1 DIABETES MELLITUS WITH HYPERGLYCEMIA (HCC): ICD-10-CM

## 2025-08-11 PROCEDURE — RXMED WILLOW AMBULATORY MEDICATION CHARGE: Performed by: STUDENT IN AN ORGANIZED HEALTH CARE EDUCATION/TRAINING PROGRAM

## 2025-08-11 RX ORDER — ACYCLOVIR 400 MG/1
1 TABLET ORAL
Qty: 9 EACH | Refills: 4 | Status: SHIPPED | OUTPATIENT
Start: 2025-08-11

## 2025-08-11 RX ORDER — ACYCLOVIR 400 MG/1
1 TABLET ORAL
Qty: 9 EACH | Refills: 3 | Status: SHIPPED | OUTPATIENT
Start: 2025-08-11

## 2025-08-13 ENCOUNTER — PHARMACY VISIT (OUTPATIENT)
Dept: PHARMACY | Facility: MEDICAL CENTER | Age: 35
End: 2025-08-13
Payer: COMMERCIAL

## 2025-08-20 ENCOUNTER — APPOINTMENT (OUTPATIENT)
Dept: RADIOLOGY | Facility: MEDICAL CENTER | Age: 35
End: 2025-08-20
Attending: STUDENT IN AN ORGANIZED HEALTH CARE EDUCATION/TRAINING PROGRAM
Payer: COMMERCIAL

## 2025-08-20 ENCOUNTER — HOSPITAL ENCOUNTER (EMERGENCY)
Facility: MEDICAL CENTER | Age: 35
End: 2025-08-20
Attending: STUDENT IN AN ORGANIZED HEALTH CARE EDUCATION/TRAINING PROGRAM
Payer: COMMERCIAL

## 2025-08-20 VITALS
TEMPERATURE: 98 F | OXYGEN SATURATION: 96 % | SYSTOLIC BLOOD PRESSURE: 124 MMHG | RESPIRATION RATE: 16 BRPM | WEIGHT: 135.1 LBS | HEIGHT: 61 IN | BODY MASS INDEX: 25.51 KG/M2 | HEART RATE: 79 BPM | DIASTOLIC BLOOD PRESSURE: 68 MMHG

## 2025-08-20 DIAGNOSIS — R10.2 PELVIC PAIN: Primary | ICD-10-CM

## 2025-08-20 DIAGNOSIS — K62.89 RECTAL PAIN: ICD-10-CM

## 2025-08-20 DIAGNOSIS — N83.202 LEFT OVARIAN CYST: ICD-10-CM

## 2025-08-20 LAB
ALBUMIN SERPL BCP-MCNC: 4.1 G/DL (ref 3.2–4.9)
ALBUMIN/GLOB SERPL: 1.4 G/DL
ALP SERPL-CCNC: 44 U/L (ref 30–99)
ALT SERPL-CCNC: 11 U/L (ref 2–50)
ANION GAP SERPL CALC-SCNC: 12 MMOL/L (ref 7–16)
APPEARANCE UR: CLEAR
AST SERPL-CCNC: 18 U/L (ref 12–45)
BASOPHILS # BLD AUTO: 0.5 % (ref 0–1.8)
BASOPHILS # BLD: 0.03 K/UL (ref 0–0.12)
BILIRUB SERPL-MCNC: <0.2 MG/DL (ref 0.1–1.5)
BILIRUB UR QL STRIP.AUTO: NEGATIVE
BUN SERPL-MCNC: 11 MG/DL (ref 8–22)
C TRACH DNA SPEC QL NAA+PROBE: NEGATIVE
CALCIUM ALBUM COR SERPL-MCNC: 8.8 MG/DL (ref 8.5–10.5)
CALCIUM SERPL-MCNC: 8.9 MG/DL (ref 8.5–10.5)
CANDIDA DNA VAG QL PROBE+SIG AMP: POSITIVE
CHLORIDE SERPL-SCNC: 105 MMOL/L (ref 96–112)
CO2 SERPL-SCNC: 21 MMOL/L (ref 20–33)
COLOR UR: YELLOW
CREAT SERPL-MCNC: 0.78 MG/DL (ref 0.5–1.4)
EOSINOPHIL # BLD AUTO: 0.14 K/UL (ref 0–0.51)
EOSINOPHIL NFR BLD: 2.4 % (ref 0–6.9)
ERYTHROCYTE [DISTWIDTH] IN BLOOD BY AUTOMATED COUNT: 37.4 FL (ref 35.9–50)
G VAGINALIS DNA VAG QL PROBE+SIG AMP: NEGATIVE
GFR SERPLBLD CREATININE-BSD FMLA CKD-EPI: 101 ML/MIN/1.73 M 2
GLOBULIN SER CALC-MCNC: 2.9 G/DL (ref 1.9–3.5)
GLUCOSE SERPL-MCNC: 129 MG/DL (ref 65–99)
GLUCOSE UR STRIP.AUTO-MCNC: NEGATIVE MG/DL
HCG SERPL QL: NEGATIVE
HCT VFR BLD AUTO: 36.2 % (ref 37–47)
HGB BLD-MCNC: 11.8 G/DL (ref 12–16)
HOLDING TUBE BB 8507: NORMAL
IMM GRANULOCYTES # BLD AUTO: 0.01 K/UL (ref 0–0.11)
IMM GRANULOCYTES NFR BLD AUTO: 0.2 % (ref 0–0.9)
KETONES UR STRIP.AUTO-MCNC: NEGATIVE MG/DL
LEUKOCYTE ESTERASE UR QL STRIP.AUTO: NEGATIVE
LIPASE SERPL-CCNC: 35 U/L (ref 11–82)
LYMPHOCYTES # BLD AUTO: 2.57 K/UL (ref 1–4.8)
LYMPHOCYTES NFR BLD: 44.2 % (ref 22–41)
MCH RBC QN AUTO: 24.2 PG (ref 27–33)
MCHC RBC AUTO-ENTMCNC: 32.6 G/DL (ref 32.2–35.5)
MCV RBC AUTO: 74.3 FL (ref 81.4–97.8)
MICRO URNS: NORMAL
MONOCYTES # BLD AUTO: 0.41 K/UL (ref 0–0.85)
MONOCYTES NFR BLD AUTO: 7 % (ref 0–13.4)
N GONORRHOEA DNA SPEC QL NAA+PROBE: NEGATIVE
NEUTROPHILS # BLD AUTO: 2.66 K/UL (ref 1.82–7.42)
NEUTROPHILS NFR BLD: 45.7 % (ref 44–72)
NITRITE UR QL STRIP.AUTO: NEGATIVE
NRBC # BLD AUTO: 0 K/UL
NRBC BLD-RTO: 0 /100 WBC (ref 0–0.2)
PH UR STRIP.AUTO: 8 [PH] (ref 5–8)
PLATELET # BLD AUTO: 297 K/UL (ref 164–446)
PMV BLD AUTO: 10.8 FL (ref 9–12.9)
POTASSIUM SERPL-SCNC: 3.7 MMOL/L (ref 3.6–5.5)
PROT SERPL-MCNC: 7 G/DL (ref 6–8.2)
PROT UR QL STRIP: NEGATIVE MG/DL
RBC # BLD AUTO: 4.87 M/UL (ref 4.2–5.4)
RBC UR QL AUTO: NEGATIVE
SODIUM SERPL-SCNC: 138 MMOL/L (ref 135–145)
SP GR UR STRIP.AUTO: 1.02
SPECIMEN SOURCE: NORMAL
T VAGINALIS DNA VAG QL PROBE+SIG AMP: NEGATIVE
UROBILINOGEN UR STRIP.AUTO-MCNC: 0.2 EU/DL
WBC # BLD AUTO: 5.8 K/UL (ref 4.8–10.8)

## 2025-08-20 PROCEDURE — 87660 TRICHOMONAS VAGIN DIR PROBE: CPT

## 2025-08-20 PROCEDURE — 81003 URINALYSIS AUTO W/O SCOPE: CPT

## 2025-08-20 PROCEDURE — 87510 GARDNER VAG DNA DIR PROBE: CPT

## 2025-08-20 PROCEDURE — A9270 NON-COVERED ITEM OR SERVICE: HCPCS | Performed by: STUDENT IN AN ORGANIZED HEALTH CARE EDUCATION/TRAINING PROGRAM

## 2025-08-20 PROCEDURE — 96374 THER/PROPH/DIAG INJ IV PUSH: CPT

## 2025-08-20 PROCEDURE — 36415 COLL VENOUS BLD VENIPUNCTURE: CPT

## 2025-08-20 PROCEDURE — 46600 DIAGNOSTIC ANOSCOPY SPX: CPT

## 2025-08-20 PROCEDURE — 87591 N.GONORRHOEAE DNA AMP PROB: CPT

## 2025-08-20 PROCEDURE — 87491 CHLMYD TRACH DNA AMP PROBE: CPT

## 2025-08-20 PROCEDURE — 99285 EMERGENCY DEPT VISIT HI MDM: CPT

## 2025-08-20 PROCEDURE — 700111 HCHG RX REV CODE 636 W/ 250 OVERRIDE (IP): Mod: JZ | Performed by: STUDENT IN AN ORGANIZED HEALTH CARE EDUCATION/TRAINING PROGRAM

## 2025-08-20 PROCEDURE — 85025 COMPLETE CBC W/AUTO DIFF WBC: CPT

## 2025-08-20 PROCEDURE — 76830 TRANSVAGINAL US NON-OB: CPT

## 2025-08-20 PROCEDURE — 700102 HCHG RX REV CODE 250 W/ 637 OVERRIDE(OP): Performed by: STUDENT IN AN ORGANIZED HEALTH CARE EDUCATION/TRAINING PROGRAM

## 2025-08-20 PROCEDURE — 80053 COMPREHEN METABOLIC PANEL: CPT

## 2025-08-20 PROCEDURE — 87480 CANDIDA DNA DIR PROBE: CPT

## 2025-08-20 PROCEDURE — 84703 CHORIONIC GONADOTROPIN ASSAY: CPT

## 2025-08-20 PROCEDURE — 83690 ASSAY OF LIPASE: CPT

## 2025-08-20 RX ORDER — KETOROLAC TROMETHAMINE 15 MG/ML
15 INJECTION, SOLUTION INTRAMUSCULAR; INTRAVENOUS ONCE
Status: COMPLETED | OUTPATIENT
Start: 2025-08-20 | End: 2025-08-20

## 2025-08-20 RX ORDER — OXYCODONE HYDROCHLORIDE 5 MG/1
5 TABLET ORAL ONCE
Refills: 0 | Status: COMPLETED | OUTPATIENT
Start: 2025-08-20 | End: 2025-08-20

## 2025-08-20 RX ADMIN — KETOROLAC TROMETHAMINE 15 MG: 15 INJECTION, SOLUTION INTRAMUSCULAR; INTRAVENOUS at 05:57

## 2025-08-20 RX ADMIN — OXYCODONE 5 MG: 5 TABLET ORAL at 05:57

## 2025-08-20 ASSESSMENT — LIFESTYLE VARIABLES: HOW OFTEN DO YOU HAVE A DRINK CONTAINING ALCOHOL: MONTHLY OR LESS

## 2025-08-20 ASSESSMENT — FIBROSIS 4 INDEX: FIB4 SCORE: 0.62

## 2025-08-21 RX ORDER — FLUCONAZOLE 150 MG/1
150 TABLET ORAL DAILY
Qty: 1 TABLET | Refills: 0 | Status: ACTIVE | OUTPATIENT
Start: 2025-08-21

## 2025-08-27 ENCOUNTER — OFFICE VISIT (OUTPATIENT)
Dept: GYNECOLOGY | Facility: CLINIC | Age: 35
End: 2025-08-27
Payer: COMMERCIAL

## 2025-08-27 VITALS
WEIGHT: 139.6 LBS | DIASTOLIC BLOOD PRESSURE: 81 MMHG | HEIGHT: 62 IN | BODY MASS INDEX: 25.69 KG/M2 | SYSTOLIC BLOOD PRESSURE: 130 MMHG | HEART RATE: 91 BPM

## 2025-08-27 DIAGNOSIS — N83.202 CYST OF LEFT OVARY: Primary | ICD-10-CM

## 2025-08-27 ASSESSMENT — FIBROSIS 4 INDEX: FIB4 SCORE: 0.64

## 2025-08-27 ASSESSMENT — LIFESTYLE VARIABLES
HOW OFTEN DO YOU HAVE A DRINK CONTAINING ALCOHOL: MONTHLY OR LESS
SKIP TO QUESTIONS 9-10: 0
AUDIT-C TOTAL SCORE: 2
HOW OFTEN DO YOU HAVE SIX OR MORE DRINKS ON ONE OCCASION: LESS THAN MONTHLY
HOW MANY STANDARD DRINKS CONTAINING ALCOHOL DO YOU HAVE ON A TYPICAL DAY: 1 OR 2